# Patient Record
Sex: MALE | Race: ASIAN | NOT HISPANIC OR LATINO | Employment: FULL TIME | URBAN - METROPOLITAN AREA
[De-identification: names, ages, dates, MRNs, and addresses within clinical notes are randomized per-mention and may not be internally consistent; named-entity substitution may affect disease eponyms.]

---

## 2021-03-05 ENCOUNTER — TRANSCRIBE ORDERS (OUTPATIENT)
Dept: ADMINISTRATIVE | Facility: HOSPITAL | Age: 31
End: 2021-03-05

## 2021-03-05 DIAGNOSIS — M25.561 RIGHT KNEE PAIN, UNSPECIFIED CHRONICITY: Primary | ICD-10-CM

## 2021-03-20 ENCOUNTER — HOSPITAL ENCOUNTER (OUTPATIENT)
Dept: MRI IMAGING | Facility: HOSPITAL | Age: 31
Discharge: HOME | End: 2021-03-20
Payer: COMMERCIAL

## 2021-03-20 DIAGNOSIS — M25.561 RIGHT KNEE PAIN, UNSPECIFIED CHRONICITY: ICD-10-CM

## 2021-03-20 PROCEDURE — G1004 CDSM NDSC: HCPCS

## 2021-03-20 PROCEDURE — 73723 MRI JOINT LWR EXTR W/O&W/DYE: CPT

## 2021-03-20 PROCEDURE — A9585 GADOBUTROL INJECTION: HCPCS | Performed by: RADIOLOGY

## 2021-03-20 RX ADMIN — GADOBUTROL 8 ML: 604.72 INJECTION INTRAVENOUS at 16:30

## 2021-06-10 ENCOUNTER — OFFICE VISIT (OUTPATIENT)
Dept: OBGYN CLINIC | Facility: CLINIC | Age: 31
End: 2021-06-10
Payer: COMMERCIAL

## 2021-06-10 ENCOUNTER — APPOINTMENT (OUTPATIENT)
Dept: RADIOLOGY | Facility: CLINIC | Age: 31
End: 2021-06-10
Payer: COMMERCIAL

## 2021-06-10 VITALS
HEIGHT: 70 IN | BODY MASS INDEX: 25.91 KG/M2 | SYSTOLIC BLOOD PRESSURE: 116 MMHG | DIASTOLIC BLOOD PRESSURE: 78 MMHG | WEIGHT: 181 LBS

## 2021-06-10 DIAGNOSIS — M76.31 IT BAND SYNDROME, RIGHT: Primary | ICD-10-CM

## 2021-06-10 DIAGNOSIS — M25.561 RIGHT KNEE PAIN, UNSPECIFIED CHRONICITY: ICD-10-CM

## 2021-06-10 DIAGNOSIS — M76.891 TENDINITIS OF RIGHT KNEE: ICD-10-CM

## 2021-06-10 PROCEDURE — 99203 OFFICE O/P NEW LOW 30 MIN: CPT | Performed by: ORTHOPAEDIC SURGERY

## 2021-06-10 PROCEDURE — 73564 X-RAY EXAM KNEE 4 OR MORE: CPT

## 2021-06-10 RX ORDER — MELOXICAM 15 MG/1
15 TABLET ORAL DAILY
COMMUNITY
End: 2021-10-21 | Stop reason: ALTCHOICE

## 2021-06-10 RX ORDER — IBUPROFEN 200 MG
TABLET ORAL EVERY 6 HOURS PRN
COMMUNITY
End: 2021-10-21 | Stop reason: ALTCHOICE

## 2021-06-10 NOTE — PROGRESS NOTES
Assessment:     1  It band syndrome, right    2  Tendinitis of right knee        Plan:     Problem List Items Addressed This Visit        Musculoskeletal and Integument    It band syndrome, right - Primary    Relevant Orders    XR knee 4+ vw right injury    Ambulatory referral to Physical Therapy    Tendinitis of right knee          Findings consistent with distal IT band syndrome, hamstring tendonitis  Imaging and prognosis reviewed with patient  Patient's symptoms most consistent with inflammation of the IT band and lateral hamstring  We will refer to rehab for hamstring, IT band stretching, modalities, HEP  Avoid activities which are aggravating factors  He can use topical creams Voltaren gel,  NSAID as needed for pain  See patient back in 6-8 weeks to re evaluate  All patient's questions were answered to his satisfaction  This note is created using dictation transcription  It may contain typographical errors, grammatical errors, improperly dictated words, background noise and other errors  Subjective:     Patient ID: Mary Haro is a 31 y o  male  Chief Complaint:  31 yr old male in for evaluation of his right knee  For the past month he has been having lateral knee pain w/o injury, trauma, change in activity  Denies instability, swelling, locking  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, progressively worsening as day goes on  He is also a runs usually on treadmill 15 minutes a day but has stopped due to pain  Also pain sitting Togolese style  He can pinpoint localized area lateral aspect of knee  No issues with stairs  He has tried 2 week course of mobic, topical creams which do help but when they wear off symptoms return  He has tried gentle stretching  Information on patient's intake form was reviewed  Allergy:  No Known Allergies  Medications:  all current active meds have been reviewed  Past Medical History:  History reviewed  No pertinent past medical history    Past Surgical History:  History reviewed  No pertinent surgical history  Family History:  Family History   Problem Relation Age of Onset    Diabetes Father     Hypertension Father      Social History:  Social History     Substance and Sexual Activity   Alcohol Use    Binge frequency: Less than monthly     Social History     Substance and Sexual Activity   Drug Use Not on file     Social History     Tobacco Use   Smoking Status Never Smoker   Smokeless Tobacco Never Used     Review of Systems   Constitutional: Negative for chills and fever  HENT: Negative for ear pain and sore throat  Eyes: Negative for pain and visual disturbance  Respiratory: Negative for cough and shortness of breath  Cardiovascular: Negative for chest pain and palpitations  Gastrointestinal: Negative for abdominal pain and vomiting  Genitourinary: Negative for dysuria and hematuria  Musculoskeletal: Positive for arthralgias (Right knee)  Negative for back pain  Skin: Negative for color change and rash  Neurological: Negative for seizures and syncope  Psychiatric/Behavioral: Negative  All other systems reviewed and are negative  Objective:  BP Readings from Last 1 Encounters:   06/10/21 116/78      Wt Readings from Last 1 Encounters:   06/10/21 82 1 kg (181 lb)      BMI:   Estimated body mass index is 25 97 kg/m² as calculated from the following:    Height as of this encounter: 5' 10" (1 778 m)  Weight as of this encounter: 82 1 kg (181 lb)  BSA:   Estimated body surface area is 2 meters squared as calculated from the following:    Height as of this encounter: 5' 10" (1 778 m)  Weight as of this encounter: 82 1 kg (181 lb)  Physical Exam  Vitals signs and nursing note reviewed  Constitutional:       Appearance: Normal appearance  He is well-developed  HENT:      Head: Normocephalic and atraumatic        Right Ear: External ear normal       Left Ear: External ear normal    Eyes:      Extraocular Movements: Extraocular movements intact  Conjunctiva/sclera: Conjunctivae normal    Neck:      Musculoskeletal: Neck supple  Pulmonary:      Effort: Pulmonary effort is normal    Musculoskeletal:      Right knee: He exhibits no effusion  Skin:     General: Skin is warm  Neurological:      Mental Status: He is alert and oriented to person, place, and time  Psychiatric:         Mood and Affect: Mood normal          Behavior: Behavior normal        Right Knee Exam     Muscle Strength   The patient has normal right knee strength  Tenderness   The patient is experiencing no tenderness  Range of Motion   The patient has normal right knee ROM  Tests   Ivy:  Medial - negative Lateral - negative  Varus: negative Valgus: negative  Patellar apprehension: negative    Other   Erythema: absent  Scars: absent  Sensation: normal  Pulse: present  Swelling: none  Effusion: no effusion present    Comments: Well tracking patella             I have personally reviewed pertinent films in PACS and my interpretation is Right knee MRI with contracts show intact meniscus, ACL, PCL, LCL, MCL  No increase bone marrow activities  X-ray of the right knee show good joint alignment  No soft tissue calcification or DJD       Scribe Attestation    I,:  Tracey Talavera am acting as a scribe while in the presence of the attending physician :       I,:  Juan Ledbetter MD personally performed the services described in this documentation    as scribed in my presence :

## 2021-06-18 ENCOUNTER — OFFICE VISIT (OUTPATIENT)
Dept: PHYSICAL THERAPY | Facility: CLINIC | Age: 31
End: 2021-06-18
Payer: COMMERCIAL

## 2021-06-18 DIAGNOSIS — M76.31 IT BAND SYNDROME, RIGHT: Primary | ICD-10-CM

## 2021-06-18 DIAGNOSIS — M76.891 TENDINITIS OF RIGHT KNEE: ICD-10-CM

## 2021-06-18 PROCEDURE — 97161 PT EVAL LOW COMPLEX 20 MIN: CPT | Performed by: PHYSICAL THERAPIST

## 2021-06-18 NOTE — PROGRESS NOTES
PT Evaluation     Today's date: 2021  Patient name: Kasia Chiu  : 1990  MRN: [id number]  Referring provider: Kaleb Krishnan PT  Dx:   Encounter Diagnosis     ICD-10-CM    1  It band syndrome, right  M76 31    2  Tendinitis of right knee  M76 891                   Assessment  Assessment details: Kasia Chiu is a 31 y o  male presenting to physical therapy with right distal biceps femoris and ITB pain, decreased range of motion, decreased strength, gait/balance dysfunction and decreased activity tolerance  Assessment reveals significant distal ITB and biceps femoris tightness with lateral patellar tracking as per testing and functional movements screen  Secondary to these impairments, patient has increased difficulty performing ADL's, household chores and  work related tasks  Phong Roberto would benefit from skilled PT to address these issues and maximize function  Thank you for the referral     Impairments: abnormal gait, abnormal muscle tone, abnormal or restricted ROM, abnormal movement, activity intolerance, impaired balance, impaired physical strength, lacks appropriate home exercise program, pain with function and weight-bearing intolerance  Understanding of Dx/Px/POC: excellent  Goals  STG (4 weeks)  1  Patient will be independent with HEP  2  Decrease pain at worst by 2 points on NPRS  3  Increase lateral step down quality of movement to good for decreased distal ITB strain  4  Patient will demonstrate ability to stand > 2 hrs prior to onset of symptoms  LTG (8 weeks)  1  Decrease pain at worst from 4 points on NPRS  2  Increase lateral step down quality of movement equal to the left for decreased distal ITB strain  3  Increase right glut medius strength by 1 MMT grade  4  Patient will demonstrate ability to stand for prolonged periods of time at work without pain or symptoms  5   Increase FOTO > or equal to expected outcome    Plan  Patient would benefit from: skilled PT  Planned therapy interventions: joint mobilization, manual therapy, neuromuscular re-education, patient education, strengthening, stretching, therapeutic exercise, home exercise program, ADL training and balance  Frequency: 2x week  Duration in weeks: 8  Treatment plan discussed with: patient        Subjective Evaluation    History of Present Illness  Mechanism of injury: Patient reports to outpatient PT secondary to acute onset of right knee (about 6 months)  Patient denies any trauma but notes that his pain is worse with sitting cross legged, weight lifting, and prolonged standing and with running  Patient also notes that his pain is worse as the day progresses  Pain is relieved by rest and sitting  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  Patient does note that he has been resting and avoiding painful activities and mobic with topical creams without significant relief  Patients goals for PT are to decrease the pain and resume normal activities and PLOF  Not a recurrent problem   Quality of life: excellent    Pain  Current pain ratin  At best pain ratin  At worst pain rating: 10  Quality: tight and sharp  Relieving factors: rest and relaxation  Aggravating factors: standing and walking  Progression: worsening    Social Support  Steps to enter house: yes  Stairs in house: yes   Lives in: multiple-level home    Employment status: working    Diagnostic Tests  X-ray: normal  MRI studies: normal  Treatments  Previous treatment: medication  Current treatment: medication  Patient Goals  Patient goals for therapy: increased strength, independence with ADLs/IADLs, return to sport/leisure activities, increased motion and decreased pain          Objective     Palpation     Right   Hypertonic in the distal biceps femoris and vastus lateralis  Tenderness of the distal biceps femoris and vastus lateralis  Tenderness     Right Knee   Tenderness in the ITB   No tenderness in the fibular head, lateral joint line, LCL (distal), LCL (proximal) and pes anserinus  Neurological Testing     Sensation     Knee   Left Knee   Intact: light touch    Right Knee   Intact: light touch     Reflexes   Left   Patellar (L4): normal (2+)  Achilles (S1): brisk (3+)  Babinski sign: negative  Clonus sign: positive    Right   Patellar (L4): normal (2+)  Achilles (S1): brisk (3+)  Babinski sign: negative  Clonus sign: positive    Active Range of Motion     Right Knee   Flexion: WFL  Extension: [facility]    Patellar Static Positioning     Additional Patellar Static Positioning Details  (+) Lateral tracking    Strength/Myotome Testing     Left Knee   Flexion: 4+  Extension: 4+    Right Knee   Flexion: 4+  Extension: 4+    Additional Strength Details  Hip Abduction: 3-/5 B/L    Tests     Right Knee   Positive lateral Ivy and medial Ivy  Negative anterior drawer, anterior Lachman, patellar compression and patella-femoral grind       Additional Tests Details  Lateral step down test:    4/7 on R  2/7 on L    (+) Jorge's on the right             Precautions: None      Manuals 6/18            R ITB stretching             R ITB distal release             R patellar taping - medial glide performed                         Neuro Re-Ed                                                                                                        Ther Ex             Side hip abduction 3x10            Side clamshells GTB  3x10            Distal ITB stretching Supine  4x30"            Standing hip abduction             Monster walks             Pball HS curls             Rec bike warm up                          Ther Activity                                       Gait Training                                       Modalities

## 2021-07-02 ENCOUNTER — OFFICE VISIT (OUTPATIENT)
Dept: PHYSICAL THERAPY | Facility: CLINIC | Age: 31
End: 2021-07-02
Payer: COMMERCIAL

## 2021-07-02 DIAGNOSIS — M76.31 IT BAND SYNDROME, RIGHT: Primary | ICD-10-CM

## 2021-07-02 DIAGNOSIS — M76.891 TENDINITIS OF RIGHT KNEE: ICD-10-CM

## 2021-07-02 PROCEDURE — 97110 THERAPEUTIC EXERCISES: CPT | Performed by: PHYSICAL THERAPIST

## 2021-07-02 NOTE — PROGRESS NOTES
Daily Note     Today's date: 2021  Patient name: Jayne Lama  : 1990  MRN: [id number]  Referring provider: Leah Beckford PT  Dx:   Encounter Diagnosis     ICD-10-CM    1  It band syndrome, right  M76 31    2  Tendinitis of right knee  M76 891                   Subjective: Patient reports improving good days but bad days remain  1 good day for every 2 bad days reported  Patient has maintained HEP compliance  Objective: See treatment diary below      Assessment: Cues required to improve mechanics with patients HEP to maximze gravity and eliminate substitutions  Progressed HEP which was noted below and patient performed well  Educated regarding lekotape/cover roll and potential j brace usage for improved patellar tracking  Plan: Continue per plan of care        Precautions: None      Manuals            R ITB stretching  4x30"           R ITB distal release  5' IASTM           R patellar taping - medial glide performed performed                        Neuro Re-Ed                                                                                                        Ther Ex             Side hip abduction 3x10 3x10           Side clamshells GTB  3x10 BTB  3x10           Distal ITB stretching Supine  4x30" reviewed           Standing hip abduction  BTB  3x10 B/L           Monster walks  BTB  x2 sets to fatigue           Pball HS curls  3x10 w/ glut lift + arm usage           Rec bike warm up                          Ther Activity                                       Gait Training                                       Modalities

## 2021-07-20 ENCOUNTER — OFFICE VISIT (OUTPATIENT)
Dept: PHYSICAL THERAPY | Facility: CLINIC | Age: 31
End: 2021-07-20
Payer: COMMERCIAL

## 2021-07-20 DIAGNOSIS — M76.31 IT BAND SYNDROME, RIGHT: Primary | ICD-10-CM

## 2021-07-20 DIAGNOSIS — M76.891 TENDINITIS OF RIGHT KNEE: ICD-10-CM

## 2021-07-20 PROCEDURE — 97140 MANUAL THERAPY 1/> REGIONS: CPT | Performed by: PHYSICAL THERAPIST

## 2021-07-20 NOTE — PROGRESS NOTES
Daily Note     Today's date: 2021  Patient name: Renata Ceja  : 1990  MRN: [id number]  Referring provider: Parviz Vazquez PT  Dx:   Encounter Diagnosis     ICD-10-CM    1  It band syndrome, right  M76 31    2  Tendinitis of right knee  M76 891                   Subjective: Patient reports continued HEP compliance and states that overall his pain is much less and feels it only intermittently  Objective: See treatment diary below      Assessment: Patient demonstrating mild tightness of the biceps femoris and lateral gastrocnemius  Patient was educated in self release and stretching of his gastroc/soleus to be performed with his HEP  Will f/u as needed in the next 1-2 weeks  Plan: Continue per plan of care  Precautions: None      Manuals           R ITB stretching  4x30" 4x30"          R ITB distal release  5' IASTM 5' IASTM + lateral gastroc release 5'          R patellar taping - medial glide performed performed                        Neuro Re-Ed                                                                                                        Ther Ex             Side hip abduction 3x10 3x10           Side clamshells GTB  3x10 BTB  3x10           Distal ITB stretching Supine  4x30" reviewed           Standing hip abduction  BTB  3x10 B/L           Monster walks  BTB  x2 sets to fatigue           Pball HS curls  3x10 w/ glut lift + arm usage           Rec bike warm up             Lax ball STM   3'          Gastroc/soleus slant stretch   4x30" ea  Ther Activity                                       Gait Training                                       Modalities                                             Renata Ceja attended physical therapy from 21 until 21 for 3 treatment sessions regarding right ITB pain    Patient made improvement throughout treatment but am unable to achieve discharge information secondary to patient not returning for follow up appointments  Patient will be discharged from PT at this time  Thank you

## 2021-10-21 ENCOUNTER — OFFICE VISIT (OUTPATIENT)
Dept: FAMILY MEDICINE CLINIC | Facility: CLINIC | Age: 31
End: 2021-10-21
Payer: COMMERCIAL

## 2021-10-21 VITALS
OXYGEN SATURATION: 99 % | SYSTOLIC BLOOD PRESSURE: 110 MMHG | HEART RATE: 76 BPM | HEIGHT: 70 IN | DIASTOLIC BLOOD PRESSURE: 70 MMHG | TEMPERATURE: 96.9 F | WEIGHT: 180 LBS | BODY MASS INDEX: 25.77 KG/M2

## 2021-10-21 DIAGNOSIS — M76.31 IT BAND SYNDROME, RIGHT: ICD-10-CM

## 2021-10-21 DIAGNOSIS — Z13.6 ENCOUNTER FOR LIPID SCREENING FOR CARDIOVASCULAR DISEASE: ICD-10-CM

## 2021-10-21 DIAGNOSIS — Z13.1 SCREENING FOR DIABETES MELLITUS: ICD-10-CM

## 2021-10-21 DIAGNOSIS — Z13.220 ENCOUNTER FOR LIPID SCREENING FOR CARDIOVASCULAR DISEASE: ICD-10-CM

## 2021-10-21 DIAGNOSIS — Z00.00 ANNUAL PHYSICAL EXAM: Primary | ICD-10-CM

## 2021-10-21 DIAGNOSIS — Z11.4 ENCOUNTER FOR SCREENING FOR HUMAN IMMUNODEFICIENCY VIRUS (HIV): ICD-10-CM

## 2021-10-21 DIAGNOSIS — Z11.59 ENCOUNTER FOR HEPATITIS C SCREENING TEST FOR LOW RISK PATIENT: ICD-10-CM

## 2021-10-21 PROCEDURE — 99385 PREV VISIT NEW AGE 18-39: CPT | Performed by: FAMILY MEDICINE

## 2021-10-21 PROCEDURE — 3725F SCREEN DEPRESSION PERFORMED: CPT | Performed by: FAMILY MEDICINE

## 2021-10-21 PROCEDURE — 3008F BODY MASS INDEX DOCD: CPT | Performed by: FAMILY MEDICINE

## 2021-10-21 PROCEDURE — 1036F TOBACCO NON-USER: CPT | Performed by: FAMILY MEDICINE

## 2023-07-11 ENCOUNTER — EVALUATION (OUTPATIENT)
Dept: PHYSICAL THERAPY | Facility: CLINIC | Age: 33
End: 2023-07-11

## 2023-07-11 DIAGNOSIS — M25.561 CHRONIC PAIN OF RIGHT KNEE: Primary | ICD-10-CM

## 2023-07-11 DIAGNOSIS — M76.31 ILIOTIBIAL BAND SYNDROME OF RIGHT SIDE: ICD-10-CM

## 2023-07-11 DIAGNOSIS — G89.29 CHRONIC PAIN OF RIGHT KNEE: Primary | ICD-10-CM

## 2023-07-11 PROCEDURE — 97110 THERAPEUTIC EXERCISES: CPT | Performed by: PHYSICAL THERAPIST

## 2023-07-11 PROCEDURE — 97161 PT EVAL LOW COMPLEX 20 MIN: CPT | Performed by: PHYSICAL THERAPIST

## 2023-07-11 NOTE — PROGRESS NOTES
PT Evaluation     Today's date: 2023  Patient name: Pennie Vasquez  : 1990  MRN: [id number]  Referring provider: Ericka Messer, *  Dx:   Encounter Diagnosis     ICD-10-CM    1. Chronic pain of right knee  M25.561     G89.29       2. Iliotibial band syndrome of right side  M76.31                      Assessment/Plan  Mr. Tre Crystal is a 34 y.o. male presenting to outpatient physical therapy with R knee pain which restricts their ability to participate in ADLs, work, and recreational activities without pain. Functional limitations are result of the following impairments: decreased proximal tib/fib mobility, decreased knee/hip strength, pain with function, and TTP fib head. Symptoms are consistent with pathoanatomical diagnosis is IT band vs tib/fib dysfunction. PMH is unremarkable. No further referral appears necessary at this time based upon examination results    Patient was given the opportunity to ask questions, and all questions were answered to the patient's satisfaction. The patients's greatest concerns are the pain not getting better, fear of not being able to stay active, getting back to recreational activity or sport and fear of pain getting worse. Patient appears motivated, agrees with the POC, and is a good candidate for skilled physical therapy at this time. Patient was provided with handout of HEP consisting of knee and hip strengthening exercises    Symptom irritability: Low   Understanding of Dx/Px/POC: good  Prognosis: good  Negative prognostic indicators include: n/a    Goals  STG (3 weeks)  Pain: Patient will subjectively report maximum pain decreased by 2/10  Strength: Patient's will demonstrate knee extension strength improved by 1/2 grade  Function: Patient increase score on FOTO by 10 points    LTG (6 weeks)  Pain: Patient will subjectively report less than 2/10 pain with functional activities.   Strength: Patient's will demonstrate knee extension and glute strength improved to WNL  Function: Patient will increase score on FOTO to predicted value or better  Patient will be ind with HEP by [place] details: Prognosis above is given PT services 1-2x/week over the next 4 weeks and home program adherence. Patient would benefit from: skilled physical therapy, home exercise program  Referral necessary: no  Planned modality interventions: Hydrocollator packs, Cryotherapy, TENS and Low level laser  Planned therapy interventions: joint mobilization, manual therapy, massage, neuromuscular re-education, patient education, stretching, strengthening, therapeutic activities, therapeutic exercise, home exercise program, functional ROM exercises, gait training, flexibility, balance, abdominal trunk stabilization, motor coordination training, coordination and behavior modification  Frequency: 2x/week for 4 weeks  Duration in visits: 8  Plan of Care beginning date: 7/11/2023   Plan of Care expiration date: 8/08/2023  Treatment plan discussed with: patient    Subjective  IE (7/11/2023): Patient is a 34 y.o. male who presents for an initial outpatient physical therapy consultation regarding their R knee pain. Patient reports that his pain began about 7-8 years ago. Intermittent in nature. About 3 years ago, he was weight lifting, and it felt like it was getting worse. Consulted ortho, had an x-ray, MRI, and EMG, which were all negative. Previous PT for IT band got 90% better. Stopped doing his HEP because it was feeling better, but the pain came back. 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 Aggravating factors: Standing, driving, sitting kisha cross, weight lifting. Alleviating factors: ibuprofen, rest, foam roller, heat, ice  Functional limitations: ADLs, work  Consultations: Patient reports that they consulted orthopedics, and xrays were     Pain  Best: 5/10  Worst: 10/10  Irritability: minutes to hours.   Location: lateral knee  Quality: burning Progression: not hcanging. Social Support  Employment status: pharmacist  Hobbies/Recreational Activities: weight lifting, running,     Diagnostic Tests  Per patient: had xray, MRI, and EMG which were all negative. Patient Goals for Therapy  "decreased pain"    Objective     Tenderness     Right Knee   Tenderness in the fibular head. No tenderness in the ITB, lateral joint line, lateral patella, lateral retinaculum, LCL (distal) and LCL (proximal). Neurological Testing     Sensation     Knee   Left Knee   Intact: light touch    Right Knee   Intact: light touch     Active Range of Motion   Left Knee   Normal active range of motion    Right Knee   Normal active range of motion    Mobility     Additional Mobility Details  Fibular head mobility: decreased    Strength/Myotome Testing     Left Hip   Planes of Motion   Flexion: 5  Abduction: 4+    Right Hip   Planes of Motion   Flexion: 4  Abduction: 4-    Left Knee   Flexion: 5  Extension: 5    Right Knee   Flexion: 4+  Extension: 4+    Tests     Right Knee   Negative lateral Ivy, medial Ivy, valgus stress test at 30 degrees and varus stress test at 30 degrees.               Precautions: n/a      Manuals 7/11            Fib head mob JF            Knee distraction moib                                       Neuro Re-Ed             clamshells HEP            Hip 3 ways HEP            Monster walk                                                                 Ther Ex             Bike             S/L hip ABD             S/L march             Leg press             Step ups             Lateral step ups                                       Ther Activity                                       Gait Training                                       Modalities             LASER Sprain/strain    4 min  15w            IE/HEP JF

## 2023-07-11 NOTE — LETTER
2023    South Carter DO  [address] [place] [id number]    Patient: Ken Carlson   YOB: 1990   Date of Visit: 2023     Encounter Diagnosis     ICD-10-CM    1. Chronic pain of right knee  M25.561     G89.29       2. Iliotibial band syndrome of right side  M76.31           Dear Dr. Luis Miguel Calvillo: Thank you for your recent referral of Ken Carlson. Please review the attached evaluation summary from Neha's recent visit. Please verify that you agree with the plan of care by signing the attached order. If you have any questions or concerns, please do not hesitate to call. I sincerely appreciate the opportunity to share in the care of one of your patients and hope to have another opportunity to work with you in the near future. Sincerely,    Joan High, PT      Referring Provider:      I certify that I have read the below Plan of Care and certify the need for these services furnished under this plan of treatment while under my care. South Carter   [address] [facility] [id number]  Via Fax: [phone]          PT Evaluation     Today's date: 2023  Patient name: Ken Carlson  : 1990  MRN: [id number]  Referring provider: South Carter, *  Dx:   Encounter Diagnosis     ICD-10-CM    1. Chronic pain of right knee  M25.561     G89.29       2. Iliotibial band syndrome of right side  M76.31                      Assessment/Plan  Mr. Marialuisa Salass is a 34 y.o. male presenting to outpatient physical therapy with R knee pain which restricts their ability to participate in ADLs, work, and recreational activities without pain. Functional limitations are result of the following impairments: decreased proximal tib/fib mobility, decreased knee/hip strength, pain with function, and TTP fib head. Symptoms are consistent with pathoanatomical diagnosis is IT band vs tib/fib dysfunction. PMH is unremarkable. No further referral appears necessary at this time based upon examination results    Patient was given the opportunity to ask questions, and all questions were answered to the patient's satisfaction. The patients's greatest concerns are the pain not getting better, fear of not being able to stay active, getting back to recreational activity or sport and fear of pain getting worse. Patient appears motivated, agrees with the POC, and is a good candidate for skilled physical therapy at this time. Patient was provided with handout of HEP consisting of knee and hip strengthening exercises    Symptom irritability: Low   Understanding of Dx/Px/POC: good  Prognosis: good  Negative prognostic indicators include: n/a    Goals  STG (3 weeks)  Pain: Patient will subjectively report maximum pain decreased by 2/10  Strength: Patient's will demonstrate knee extension strength improved by 1/2 grade  Function: Patient increase score on FOTO by 10 points    LTG (6 weeks)  Pain: Patient will subjectively report less than 2/10 pain with functional activities. Strength: Patient's will demonstrate knee extension and glute strength improved to WNL  Function: Patient will increase score on FOTO to predicted value or better  Patient will be ind with HEP by [place] details: Prognosis above is given PT services 1-2x/week over the next 4 weeks and home program adherence.    Patient would benefit from: skilled physical therapy, home exercise program  Referral necessary: no  Planned modality interventions: Hydrocollator packs, Cryotherapy, TENS and Low level laser  Planned therapy interventions: joint mobilization, manual therapy, massage, neuromuscular re-education, patient education, stretching, strengthening, therapeutic activities, therapeutic exercise, home exercise program, functional ROM exercises, gait training, flexibility, balance, abdominal trunk stabilization, motor coordination training, coordination and behavior modification  Frequency: 2x/week for 4 weeks  Duration in visits: 8  Plan of Care beginning date: 7/11/2023   Plan of Care expiration date: 8/08/2023  Treatment plan discussed with: patient    Subjective  IE (7/11/2023): Patient is a 34 y.o. male who presents for an initial outpatient physical therapy consultation regarding their R knee pain. Patient reports that his pain began about 7-8 years ago. Intermittent in nature. About 3 years ago, he was weight lifting, and it felt like it was getting worse. Consulted ortho, had an x-ray, MRI, and EMG, which were all negative. Previous PT for IT band got 90% better. Stopped doing his HEP because it was feeling better, but the pain came back. 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 Aggravating factors: Standing, driving, sitting kisha cross, weight lifting. Alleviating factors: ibuprofen, rest, foam roller, heat, ice  Functional limitations: ADLs, work  Consultations: Patient reports that they consulted orthopedics, and xrays were     Pain  Best: 5/10  Worst: 10/10  Irritability: minutes to hours. Location: lateral knee  Quality: burning   Progression: not hcanging. Social Support  Employment status: pharmacist  Hobbies/Recreational Activities: weight lifting, running,     Diagnostic Tests  Per patient: had xray, MRI, and EMG which were all negative. Patient Goals for Therapy  "decreased pain"    Objective     Tenderness     Right Knee   Tenderness in the fibular head. No tenderness in the ITB, lateral joint line, lateral patella, lateral retinaculum, LCL (distal) and LCL (proximal).      Neurological Testing     Sensation     Knee   Left Knee   Intact: light touch    Right Knee   Intact: light touch     Active Range of Motion   Left Knee   Normal active range of motion    Right Knee   Normal active range of motion    Mobility     Additional Mobility Details  Fibular head mobility: decreased    Strength/Myotome Testing Left Hip   Planes of Motion   Flexion: 5  Abduction: 4+    Right Hip   Planes of Motion   Flexion: 4  Abduction: 4-    Left Knee   Flexion: 5  Extension: 5    Right Knee   Flexion: 4+  Extension: 4+    Tests     Right Knee   Negative lateral Ivy, medial Ivy, valgus stress test at 30 degrees and varus stress test at 30 degrees.              Precautions: n/a      Manuals 7/11            Fib head mob JF            Knee distraction moib                                       Neuro Re-Ed             clamshells HEP            Hip 3 ways HEP            Monster walk                                                                 Ther Ex             Bike             S/L hip ABD             S/L march             Leg press             Step ups             Lateral step ups                                       Ther Activity                                       Gait Training                                       Modalities             LASER Sprain/strain    4 min  15w            IE/HEP JF

## 2023-07-13 ENCOUNTER — OFFICE VISIT (OUTPATIENT)
Dept: PHYSICAL THERAPY | Facility: CLINIC | Age: 33
End: 2023-07-13

## 2023-07-13 DIAGNOSIS — M76.31 ILIOTIBIAL BAND SYNDROME OF RIGHT SIDE: ICD-10-CM

## 2023-07-13 DIAGNOSIS — G89.29 CHRONIC PAIN OF RIGHT KNEE: Primary | ICD-10-CM

## 2023-07-13 DIAGNOSIS — M25.561 CHRONIC PAIN OF RIGHT KNEE: Primary | ICD-10-CM

## 2023-07-13 PROCEDURE — 97110 THERAPEUTIC EXERCISES: CPT | Performed by: PHYSICAL THERAPIST

## 2023-07-13 PROCEDURE — 97112 NEUROMUSCULAR REEDUCATION: CPT | Performed by: PHYSICAL THERAPIST

## 2023-07-13 NOTE — PROGRESS NOTES
Daily Note     Today's date: 2023  Patient name: Kiya Latif  : 1990  MRN: [id number]  Referring provider: Crista Osler, *  Dx:   Encounter Diagnosis     ICD-10-CM    1. Chronic pain of right knee  M25.561     G89.29       2. Iliotibial band syndrome of right side  M76.31                      Subjective: Mild soreness after the eval, but overall he feels a little better. Objective: See treatment diary below. Treatment as indicated below. Assessment: Reviewed HEP to ensure proper form and to answer any questions regarding prescribed exercises. Patient demonstrates good recall and form with minimal cuing. Patient was fatigued post-session. Continue to progress as tolerated. Patient will continue to benefit from skilled PT in order to address impairments and improve function. Plan: Continue per plan of care. Progress treatment as tolerated.        Precautions: n/a      Manuals            Fib head mob JF JF           Knee distraction moib                                       Neuro Re-Ed             clamshells HEP BTB  2x10           Hip 3 ways HEP            Monster walk                                                                 Ther Ex             Bike  5 min           S/L hip ABD             Side lying   GTB  2x10           Leg press  3x10  75#           Step ups  8"  2x10           Lateral step ups             LAQ  5#  3x10                        Ther Activity                                       Gait Training                                       Modalities             LASER Sprain/strain    4 min  15w Sprain/strain    4 min  15w           IE/HEP JF

## 2023-07-18 ENCOUNTER — OFFICE VISIT (OUTPATIENT)
Dept: PHYSICAL THERAPY | Facility: CLINIC | Age: 33
End: 2023-07-18

## 2023-07-18 DIAGNOSIS — G89.29 CHRONIC PAIN OF RIGHT KNEE: Primary | ICD-10-CM

## 2023-07-18 DIAGNOSIS — M76.31 ILIOTIBIAL BAND SYNDROME OF RIGHT SIDE: ICD-10-CM

## 2023-07-18 DIAGNOSIS — M25.561 CHRONIC PAIN OF RIGHT KNEE: Primary | ICD-10-CM

## 2023-07-18 PROCEDURE — 97140 MANUAL THERAPY 1/> REGIONS: CPT | Performed by: PHYSICAL THERAPIST

## 2023-07-18 PROCEDURE — 97110 THERAPEUTIC EXERCISES: CPT | Performed by: PHYSICAL THERAPIST

## 2023-07-18 NOTE — PROGRESS NOTES
Daily Note     Today's date: 2023  Patient name: Christine Clemente  : 1990  MRN: [id number]  Referring provider: Meghna Diaz, *  Dx:   Encounter Diagnosis     ICD-10-CM    1. Chronic pain of right knee  M25.561     G89.29       2. Iliotibial band syndrome of right side  M76.31                      Subjective: Knee feels about the same. A few hours into work he starts having the knee pain. Objective: See treatment diary below. Treatment as indicated below. Assessment: Evident fatigue of the glute with progressions this session. Patient continues to be challenged appropriately with ther-ex. Continue to progress as tolerated. Patient will continue to benefit from skilled PT in order to address impairments and improve function. Plan: Continue per plan of care. Progress treatment as tolerated.        Precautions: n/a      Manuals           Fib head mob JF JF JF          Knee distraction moib                                       Neuro Re-Ed             clamshells HEP BTB  2x10 BTB  2x10          Hip 3 ways HEP  [place]  2x10          Monster walk                                                                 Ther Ex             Bike  5 min 5 min          S/L hip ABD             Side lying   GTB  2x10 GTB  2x10          Leg press  3x10  75# 3x10  75#          Step ups  8"  2x10 8"  2x10          Lateral step ups             LAQ  5#  3x10 5#  3x10          Lunges             STS   BMB  2x10          Ther Activity                                       Gait Training                                       Modalities             LASER Sprain/strain    4 min  15w Sprain/strain    4 min  15w Sprain/strain    4 min  15w          IE/HEP JF

## 2023-07-20 ENCOUNTER — OFFICE VISIT (OUTPATIENT)
Dept: PHYSICAL THERAPY | Facility: CLINIC | Age: 33
End: 2023-07-20

## 2023-07-20 DIAGNOSIS — M76.31 ILIOTIBIAL BAND SYNDROME OF RIGHT SIDE: ICD-10-CM

## 2023-07-20 DIAGNOSIS — M25.561 CHRONIC PAIN OF RIGHT KNEE: Primary | ICD-10-CM

## 2023-07-20 DIAGNOSIS — G89.29 CHRONIC PAIN OF RIGHT KNEE: Primary | ICD-10-CM

## 2023-07-20 PROCEDURE — 97110 THERAPEUTIC EXERCISES: CPT | Performed by: PHYSICAL THERAPIST

## 2023-07-20 PROCEDURE — 97112 NEUROMUSCULAR REEDUCATION: CPT | Performed by: PHYSICAL THERAPIST

## 2023-07-20 NOTE — PROGRESS NOTES
Daily Note     Today's date: 2023  Patient name: Sophia Marcus  : 1990  MRN: [id number]  Referring provider: Harris Dickerson, *  Dx:   Encounter Diagnosis     ICD-10-CM    1. Chronic pain of right knee  M25.561     G89.29       2. Iliotibial band syndrome of right side  M76.31                      Subjective: Patient reports being sore following last visit. Notes that his knee is starting to feel a little better. Objective: See treatment diary below. Treatment as indicated below. Assessment: Exercise tolerance improving. Evident fatigue of the glute with progressions this session. Patient continues to be challenged appropriately with ther-ex. Continue to progress as tolerated. Patient will continue to benefit from skilled PT in order to address impairments and improve function. Plan: Continue per plan of care. Progress treatment as tolerated.        Precautions: n/a      Manuals          Fib head mob JF JF JF          Knee distraction moib                                       Neuro Re-Ed             clamshells HEP BTB  2x10 BTB  2x10 BTB  2x10         Hip 3 ways HEP  [place]  2x10 Pink  2x10         Monster walk                                                                 Ther Ex             Bike  5 min 5 min 5 min         Side lying rev march  GTB  2x10 GTB  2x10 GTB  2x10         Leg press  3x10  75# 3x10  75# 3x10  75#         Step ups  8"  2x10 8"  2x10 8"  2x10         Lateral step ups             LAQ  5#  3x10 5#  3x10 7.5#  3x10         Lunges             STS   BMB  2x10 BMB  2x10  staggered stance         Ther Activity                                       Gait Training                                       Modalities             LASER Sprain/strain    4 min  15w Sprain/strain    4 min  15w Sprain/strain    4 min  15w Sprain/ strain    4 min  15w         IE/HEP JF

## 2023-07-25 ENCOUNTER — OFFICE VISIT (OUTPATIENT)
Dept: PHYSICAL THERAPY | Facility: CLINIC | Age: 33
End: 2023-07-25

## 2023-07-25 DIAGNOSIS — M25.561 CHRONIC PAIN OF RIGHT KNEE: Primary | ICD-10-CM

## 2023-07-25 DIAGNOSIS — G89.29 CHRONIC PAIN OF RIGHT KNEE: Primary | ICD-10-CM

## 2023-07-25 DIAGNOSIS — M76.31 ILIOTIBIAL BAND SYNDROME OF RIGHT SIDE: ICD-10-CM

## 2023-07-25 PROCEDURE — 97112 NEUROMUSCULAR REEDUCATION: CPT | Performed by: PHYSICAL THERAPIST

## 2023-07-25 PROCEDURE — 97110 THERAPEUTIC EXERCISES: CPT | Performed by: PHYSICAL THERAPIST

## 2023-07-25 NOTE — PROGRESS NOTES
Daily Note     Today's date: 2023  Patient name: Rupa Maradiaga  : 1990  MRN: [id number]  Referring provider: Dulce Maria Trevino, *  Dx:   Encounter Diagnosis     ICD-10-CM    1. Chronic pain of right knee  M25.561     G89.29       2. Iliotibial band syndrome of right side  M76.31                      Subjective: Patient reports that he feel the lateral knee is a little better. Feeling it more up the ITB      Objective: See treatment diary below. Treatment as indicated below. Assessment: Exercise tolerance improving. Evident fatigue of the glute with progressions this session, though it is improving. Patient continues to be challenged appropriately with ther-ex. Added an ITB stretch this session. Continue to progress as tolerated. Patient will continue to benefit from skilled PT in order to address impairments and improve function. Plan: Continue per plan of care. Progress treatment as tolerated.        Precautions: n/a      Manuals         Fib head mob JF JF JF          Knee distraction moib                                       Neuro Re-Ed             clamshells HEP BTB  2x10 BTB  2x10 BTB  2x10 BTB  2x10        Hip 3 ways HEP  [place]  2x10 Pink  2x10 Pink  2x10        Monster walk                                                                 Ther Ex             Bike  5 min 5 min 5 min 5 min        Side lying rev march  GTB  2x10 GTB  2x10 GTB  2x10 GTB  2x10        Leg press  3x10  75# 3x10  75# 3x10  75# 3x10 75#        Step ups  8"  2x10 8"  2x10 8"  2x10 8"  2x10        Supine ITB strap stretch     15"x5        LAQ  5#  3x10 5#  3x10 7.5#  3x10 7.5#  3x10        Lunges             STS   BMB  2x10 BMB  2x10  staggered stance BMB  2x10  staggered stance        Ther Activity                                       Gait Training                                       Modalities             LASER Sprain/strain    4 min  15w Sprain/strain    4 min  15w Sprain/strain    4 min  15w Sprain/ strain    4 min  15w Sprain/ strain    4 min  15w        IE/HEP JF

## 2023-07-27 ENCOUNTER — OFFICE VISIT (OUTPATIENT)
Dept: PHYSICAL THERAPY | Facility: CLINIC | Age: 33
End: 2023-07-27

## 2023-07-27 DIAGNOSIS — M25.561 CHRONIC PAIN OF RIGHT KNEE: Primary | ICD-10-CM

## 2023-07-27 DIAGNOSIS — G89.29 CHRONIC PAIN OF RIGHT KNEE: Primary | ICD-10-CM

## 2023-07-27 DIAGNOSIS — M76.31 ILIOTIBIAL BAND SYNDROME OF RIGHT SIDE: ICD-10-CM

## 2023-07-27 PROCEDURE — 97112 NEUROMUSCULAR REEDUCATION: CPT | Performed by: PHYSICAL THERAPIST

## 2023-07-27 PROCEDURE — 97110 THERAPEUTIC EXERCISES: CPT | Performed by: PHYSICAL THERAPIST

## 2023-07-27 NOTE — PROGRESS NOTES
Daily Note     Today's date: 2023  Patient name: Christine Clemente  : 1990  MRN: [id number]  Referring provider: Meghna Diaz, *  Dx:   Encounter Diagnosis     ICD-10-CM    1. Chronic pain of right knee  M25.561     G89.29       2. Iliotibial band syndrome of right side  M76.31                      Subjective: Patient reports that he is having more discomfort in the glute and ITB area. Thinks the ITB stretch isn't helping. Objective: See treatment diary below. Treatment as indicated below. Assessment: Discussed DOMS. Less fatigue with exercise this session. Patient continues to be challenged appropriately with ther-ex. Added a standing ITB stretch this session. Continue to progress as tolerated. Patient will continue to benefit from skilled PT in order to address impairments and improve function. Plan: Continue per plan of care. Progress treatment as tolerated.        Precautions: n/a      Manuals        Fib head mob JF JF JF          Knee distraction moib                                       Neuro Re-Ed             clamshells HEP BTB  2x10 BTB  2x10 BTB  2x10 BTB  2x10 BTB  2x10       Hip 3 ways HEP  [place]  2x10 Pink  2x10 Pink  2x10 Pink  2x10       Monster walk                                                                 Ther Ex             Bike  5 min 5 min 5 min 5 min 5 min       Side lying rev march  GTB  2x10 GTB  2x10 GTB  2x10 GTB  2x10 GTB  2x10       Leg press  3x10  75# 3x10  75# 3x10  75# 3x10 75# 3x10  75#       Step ups  8"  2x10 8"  2x10 8"  2x10 8"  2x10 8"  2x10       Supine ITB strap stretch     15"x5        LAQ  5#  3x10 5#  3x10 7.5#  3x10 7.5#  3x10 7.5#  3x10       Lunges             STS   BMB  2x10 BMB  2x10  staggered stance BMB  2x10  staggered stance BMB  2x10  staggered stance       Wall ITB stretch      5x15"       Ther Activity                                       Gait Training Modalities             LASER Sprain/strain    4 min  15w Sprain/strain    4 min  15w Sprain/strain    4 min  15w Sprain/ strain    4 min  15w Sprain/ strain    4 min  15w Sprain/ strain    4 min  15w       IE/HEP JF

## 2023-08-01 ENCOUNTER — APPOINTMENT (OUTPATIENT)
Dept: PHYSICAL THERAPY | Facility: CLINIC | Age: 33
End: 2023-08-01

## 2023-08-03 ENCOUNTER — OFFICE VISIT (OUTPATIENT)
Dept: PHYSICAL THERAPY | Facility: CLINIC | Age: 33
End: 2023-08-03

## 2023-08-03 DIAGNOSIS — M25.561 CHRONIC PAIN OF RIGHT KNEE: Primary | ICD-10-CM

## 2023-08-03 DIAGNOSIS — G89.29 CHRONIC PAIN OF RIGHT KNEE: Primary | ICD-10-CM

## 2023-08-03 DIAGNOSIS — M76.31 ILIOTIBIAL BAND SYNDROME OF RIGHT SIDE: ICD-10-CM

## 2023-08-03 PROCEDURE — 97110 THERAPEUTIC EXERCISES: CPT | Performed by: PHYSICAL THERAPIST

## 2023-08-03 PROCEDURE — 97112 NEUROMUSCULAR REEDUCATION: CPT | Performed by: PHYSICAL THERAPIST

## 2023-08-03 NOTE — PROGRESS NOTES
Daily Note     Today's date: 2023  Patient name: Iván Cortes  : 1990  MRN: [id number]  Referring provider: Andre Maldonado, *  Dx:   Encounter Diagnosis     ICD-10-CM    1. Chronic pain of right knee  M25.561     G89.29       2. Iliotibial band syndrome of right side  M76.31                      Subjective: Patient reports that he is having more discomfort in the glute and ITB area. Hard to sleep at night this week. Does note a more active weekend. Objective: See treatment diary below. Treatment as indicated below. Reassessed nerve tension and lumbar mobility  Peripheralization of symptoms to lateral thigh and knee with lumbar flexion  Centralization of symptoms to low back with lumbar extension (standing and prone)      Assessment: Patient appears to have a component of his symptoms coming from his spine. Noted mild nerve tension and a directional preference for extension. Added a standing/prone extension and sciatic nerve glides to patient's HEP, and instructed him to hold all other exercises for the time being. Also educated patient to remain active as much as possible. Continue to progress as tolerated. Patient will continue to benefit from skilled PT in order to address impairments and improve function. Plan: Continue per plan of care. Progress treatment as tolerated.        Precautions: n/a      Manuals /3      Fib head mob JF JF JF          Knee distraction moib             Lumbar PAs       nv- pending response to HEP                   Neuro Re-Ed             clamshells HEP BTB  2x10 BTB  2x10 BTB  2x10 BTB  2x10 BTB  2x10 BTB  2x10      Hip 3 ways HEP  [place]  2x10 Pink  2x10 Pink  2x10 Pink  2x10 held      Monster walk                                                                 Ther Ex             Bike  5 min 5 min 5 min 5 min 5 min 5 min      Side lying rev march  GTB  2x10 GTB  2x10 GTB  2x10 GTB  2x10 GTB  2x10 GTB  2x10      Leg press 3x10  75# 3x10  75# 3x10  75# 3x10 75# 3x10  75# held      Step ups  8"  2x10 8"  2x10 8"  2x10 8"  2x10 8"  2x10 8"  2x10      Supine ITB strap stretch     15"x5        LAQ  5#  3x10 5#  3x10 7.5#  3x10 7.5#  3x10 7.5#  3x10 held      Lunges             STS   BMB  2x10 BMB  2x10  staggered stance BMB  2x10  staggered stance BMB  2x10  staggered stance BMB  2x10  staggered stance      Wall ITB stretch      5x15" held      Ther Activity                                       Gait Training                                       Modalities             LASER Sprain/strain    4 min  15w Sprain/strain    4 min  15w Sprain/strain    4 min  15w Sprain/ strain    4 min  15w Sprain/ strain    4 min  15w Sprain/ strain    4 min  15w held      IE/HEP JF

## 2023-08-08 ENCOUNTER — OFFICE VISIT (OUTPATIENT)
Dept: PHYSICAL THERAPY | Facility: CLINIC | Age: 33
End: 2023-08-08

## 2023-08-08 DIAGNOSIS — M25.561 CHRONIC PAIN OF RIGHT KNEE: Primary | ICD-10-CM

## 2023-08-08 DIAGNOSIS — M76.31 ILIOTIBIAL BAND SYNDROME OF RIGHT SIDE: ICD-10-CM

## 2023-08-08 DIAGNOSIS — G89.29 CHRONIC PAIN OF RIGHT KNEE: Primary | ICD-10-CM

## 2023-08-08 PROCEDURE — 97112 NEUROMUSCULAR REEDUCATION: CPT | Performed by: PHYSICAL THERAPIST

## 2023-08-08 PROCEDURE — 97110 THERAPEUTIC EXERCISES: CPT | Performed by: PHYSICAL THERAPIST

## 2023-08-08 NOTE — PROGRESS NOTES
Daily Note     Today's date: 2023  Patient name: Kiya Latif  : 1990  MRN: [id number]  Referring provider: Crista Osler, *  Dx:   Encounter Diagnosis     ICD-10-CM    1. Chronic pain of right knee  M25.561     G89.29       2. Iliotibial band syndrome of right side  M76.31                      Subjective: Patient reports that he is having more discomfort in the glute and ITB area. Hard to sleep at night this week. Does note a more active weekend. Objective: See treatment diary below. Treatment as indicated below. Reassessed nerve tension and lumbar mobility  Peripheralization of symptoms to lateral thigh and knee with lumbar flexion  Centralization of symptoms to low back with lumbar extension (standing and prone)      Assessment: Patient appears to have a component of his symptoms coming from his spine. Noted mild nerve tension and a directional preference for extension. Added a standing/prone extension and sciatic nerve glides to patient's HEP, and instructed him to hold all other exercises for the time being. Also educated patient to remain active as much as possible. Continue to progress as tolerated. Patient will continue to benefit from skilled PT in order to address impairments and improve function. Plan: Continue per plan of care. Progress treatment as tolerated.        Precautions: n/a      Manuals /3      Fib head mob JF JF JF          Knee distraction moib             Lumbar PAs       nv- pending response to HEP                   Neuro Re-Ed             clamshells HEP BTB  2x10 BTB  2x10 BTB  2x10 BTB  2x10 BTB  2x10 BTB  2x10      Hip 3 ways HEP  [place]  2x10 Pink  2x10 Pink  2x10 Pink  2x10 held      Monster walk                                                                 Ther Ex             Bike  5 min 5 min 5 min 5 min 5 min 5 min      Side lying rev march  GTB  2x10 GTB  2x10 GTB  2x10 GTB  2x10 GTB  2x10 GTB  2x10      Leg press 3x10  75# 3x10  75# 3x10  75# 3x10 75# 3x10  75# held      Step ups  8"  2x10 8"  2x10 8"  2x10 8"  2x10 8"  2x10 8"  2x10      Supine ITB strap stretch     15"x5        LAQ  5#  3x10 5#  3x10 7.5#  3x10 7.5#  3x10 7.5#  3x10 held      Lunges             STS   BMB  2x10 BMB  2x10  staggered stance BMB  2x10  staggered stance BMB  2x10  staggered stance BMB  2x10  staggered stance      Wall ITB stretch      5x15" held      Ther Activity                                       Gait Training                                       Modalities             LASER Sprain/strain    4 min  15w Sprain/strain    4 min  15w Sprain/strain    4 min  15w Sprain/ strain    4 min  15w Sprain/ strain    4 min  15w Sprain/ strain    4 min  15w held      IE/HEP JF               Daily Note     Today's date: 2023  Patient name: Charissa Hung  : 1990  MRN: [id number]  Referring provider: Shun Carter, *  Dx:   Encounter Diagnosis     ICD-10-CM    1. Chronic pain of right knee  M25.561     G89.29       2. Iliotibial band syndrome of right side  M76.31                      Subjective: Patient reports that he is having more discomfort in the glute and ITB area. Hard to sleep at night this week. Does note a more active weekend. Objective: See treatment diary below. Treatment as indicated below. Reassessed nerve tension and lumbar mobility  Peripheralization of symptoms to lateral thigh and knee with lumbar flexion  Centralization of symptoms to low back with lumbar extension (standing and prone)      Assessment: Patient appears to have a component of his symptoms coming from his spine. Noted mild nerve tension and a directional preference for extension. Added a standing/prone extension and sciatic nerve glides to patient's HEP, and instructed him to hold all other exercises for the time being. Also educated patient to remain active as much as possible. Continue to progress as tolerated.  Patient will continue to benefit from skilled PT in order to address impairments and improve function. Plan: Continue per plan of care. Progress treatment as tolerated. Precautions: n/a      Manuals  8/3      Fib head mob JF JF JF          Knee distraction moib             Lumbar PAs       nv- pending response to HEP                   Neuro Re-Ed             clamshells HEP BTB  2x10 BTB  2x10 BTB  2x10 BTB  2x10 BTB  2x10 BTB  2x10      Hip 3 ways HEP  [place]  2x10 Pink  2x10 Pink  2x10 Pink  2x10 held      Monster walk                                                                 Ther Ex             Bike  5 min 5 min 5 min 5 min 5 min 5 min      Side lying rev march  GTB  2x10 GTB  2x10 GTB  2x10 GTB  2x10 GTB  2x10 GTB  2x10      Leg press  3x10  75# 3x10  75# 3x10  75# 3x10 75# 3x10  75# held      Step ups  8"  2x10 8"  2x10 8"  2x10 8"  2x10 8"  2x10 8"  2x10      Supine ITB strap stretch     15"x5        LAQ  5#  3x10 5#  3x10 7.5#  3x10 7.5#  3x10 7.5#  3x10 held      Lunges             STS   BMB  2x10 BMB  2x10  staggered stance BMB  2x10  staggered stance BMB  2x10  staggered stance BMB  2x10  staggered stance      Wall ITB stretch      5x15" held      Ther Activity                                       Gait Training                                       Modalities             LASER Sprain/strain    4 min  15w Sprain/strain    4 min  15w Sprain/strain    4 min  15w Sprain/ strain    4 min  15w Sprain/ strain    4 min  15w Sprain/ strain    4 min  15w held      IE/HEP JF               Daily Note     Today's date: 2023  Patient name: Christine Clemente  : 1990  MRN: [id number]  Referring provider: Meghna Diaz, *  Dx:   Encounter Diagnosis     ICD-10-CM    1. Chronic pain of right knee  M25.561     G89.29       2. Iliotibial band syndrome of right side  M76.31                      Subjective: Patient reports that the burning pain in his lateral pain has improved with the HEP. Noting more lateral knee pain this session. Objective: See treatment diary below. Treatment as indicated below. Assessment: Added ITB STM this session and it improved patient's lateral knee pain with exercises. The combination of glute strengthening and extension based lumbar exercises has patient trending in the right direction. Continue to progress as tolerated. Patient will continue to benefit from skilled PT in order to address impairments and improve function. Plan: Continue per plan of care. Progress treatment as tolerated.        Precautions: n/a      Manuals 7/11 7/13 7/18 7/20 7/25 7/27 8/3 8/8     Fib head mob JF JF JF          ITB STM        JF     Lumbar PAs       nv- pending response to HEP                   Neuro Re-Ed             clamshells HEP BTB  2x10 BTB  2x10 BTB  2x10 BTB  2x10 BTB  2x10 BTB  2x10 Purple  2x10     Hip 3 ways HEP  [place]  2x10 Pink  2x10 Pink  2x10 Pink  2x10 held      Monster walk             Prone press        10x10"     Standing lumbar extension        x10                               Ther Ex             Bike  5 min 5 min 5 min 5 min 5 min 5 min 5 min     Side lying rev march  GTB  2x10 GTB  2x10 GTB  2x10 GTB  2x10 GTB  2x10 GTB  2x10 GTB  2x10     Leg press  3x10  75# 3x10  75# 3x10  75# 3x10 75# 3x10  75# held 75#  3x10     Step ups  8"  2x10 8"  2x10 8"  2x10 8"  2x10 8"  2x10 8"  2x10 8"  2x10     Supine ITB strap stretch     15"x5        LAQ  5#  3x10 5#  3x10 7.5#  3x10 7.5#  3x10 7.5#  3x10 held held     Lunges             STS   BMB  2x10 BMB  2x10  staggered stance BMB  2x10  staggered stance BMB  2x10  staggered stance BMB  2x10  staggered stance BMB  2x10  staggered stance     Wall ITB stretch      5x15" held      Ther Activity                                       Gait Training                                       Modalities             LASER Sprain/strain    4 min  15w Sprain/strain    4 min  15w Sprain/strain    4 min  15w Sprain/ strain    4 min  15w Sprain/ strain    4 min  15w Sprain/ strain    4 min  15w held      IE/HEP JF

## 2023-08-10 ENCOUNTER — OFFICE VISIT (OUTPATIENT)
Dept: PHYSICAL THERAPY | Facility: CLINIC | Age: 33
End: 2023-08-10

## 2023-08-10 DIAGNOSIS — M25.561 CHRONIC PAIN OF RIGHT KNEE: ICD-10-CM

## 2023-08-10 DIAGNOSIS — M76.31 ILIOTIBIAL BAND SYNDROME OF RIGHT SIDE: Primary | ICD-10-CM

## 2023-08-10 DIAGNOSIS — G89.29 CHRONIC PAIN OF RIGHT KNEE: ICD-10-CM

## 2023-08-10 PROCEDURE — 97112 NEUROMUSCULAR REEDUCATION: CPT | Performed by: PHYSICAL THERAPIST

## 2023-08-10 PROCEDURE — 97110 THERAPEUTIC EXERCISES: CPT | Performed by: PHYSICAL THERAPIST

## 2023-08-10 NOTE — PROGRESS NOTES
Daily Note     Today's date: 2023  Patient name: Blanka Perez  : 1990  MRN: [id number]  Referring provider: Josue Chung, *  Dx:   Encounter Diagnosis     ICD-10-CM    1. Chronic pain of right knee  M25.561     G89.29       2. Iliotibial band syndrome of right side  M76.31                      Subjective: Patient reports that he is having more discomfort in the glute and ITB area. Hard to sleep at night this week. Does note a more active weekend. Objective: See treatment diary below. Treatment as indicated below. Reassessed nerve tension and lumbar mobility  Peripheralization of symptoms to lateral thigh and knee with lumbar flexion  Centralization of symptoms to low back with lumbar extension (standing and prone)      Assessment: Patient appears to have a component of his symptoms coming from his spine. Noted mild nerve tension and a directional preference for extension. Added a standing/prone extension and sciatic nerve glides to patient's HEP, and instructed him to hold all other exercises for the time being. Also educated patient to remain active as much as possible. Continue to progress as tolerated. Patient will continue to benefit from skilled PT in order to address impairments and improve function. Plan: Continue per plan of care. Progress treatment as tolerated.        Precautions: n/a      Manuals /3      Fib head mob JF JF JF          Knee distraction moib             Lumbar PAs       nv- pending response to HEP                   Neuro Re-Ed             clamshells HEP BTB  2x10 BTB  2x10 BTB  2x10 BTB  2x10 BTB  2x10 BTB  2x10      Hip 3 ways HEP  [place]  2x10 Pink  2x10 Pink  2x10 Pink  2x10 held      Monster walk                                                                 Ther Ex             Bike  5 min 5 min 5 min 5 min 5 min 5 min      Side lying rev march  GTB  2x10 GTB  2x10 GTB  2x10 GTB  2x10 GTB  2x10 GTB  2x10      Leg press 3x10  75# 3x10  75# 3x10  75# 3x10 75# 3x10  75# held      Step ups  8"  2x10 8"  2x10 8"  2x10 8"  2x10 8"  2x10 8"  2x10      Supine ITB strap stretch     15"x5        LAQ  5#  3x10 5#  3x10 7.5#  3x10 7.5#  3x10 7.5#  3x10 held      Lunges             STS   BMB  2x10 BMB  2x10  staggered stance BMB  2x10  staggered stance BMB  2x10  staggered stance BMB  2x10  staggered stance      Wall ITB stretch      5x15" held      Ther Activity                                       Gait Training                                       Modalities             LASER Sprain/strain    4 min  15w Sprain/strain    4 min  15w Sprain/strain    4 min  15w Sprain/ strain    4 min  15w Sprain/ strain    4 min  15w Sprain/ strain    4 min  15w held      IE/HEP JF               Daily Note     Today's date: 2023  Patient name: Letha Maloney  : 1990  MRN: [id number]  Referring provider: Devon Rutherford, *  Dx:   Encounter Diagnosis     ICD-10-CM    1. Chronic pain of right knee  M25.561     G89.29       2. Iliotibial band syndrome of right side  M76.31                      Subjective: Patient reports that he is having more discomfort in the glute and ITB area. Hard to sleep at night this week. Does note a more active weekend. Objective: See treatment diary below. Treatment as indicated below. Reassessed nerve tension and lumbar mobility  Peripheralization of symptoms to lateral thigh and knee with lumbar flexion  Centralization of symptoms to low back with lumbar extension (standing and prone)      Assessment: Patient appears to have a component of his symptoms coming from his spine. Noted mild nerve tension and a directional preference for extension. Added a standing/prone extension and sciatic nerve glides to patient's HEP, and instructed him to hold all other exercises for the time being. Also educated patient to remain active as much as possible. Continue to progress as tolerated.  Patient will continue to benefit from skilled PT in order to address impairments and improve function. Plan: Continue per plan of care. Progress treatment as tolerated. Precautions: n/a      Manuals  8/3      Fib head mob JF JF JF          Knee distraction moib             Lumbar PAs       nv- pending response to HEP                   Neuro Re-Ed             clamshells HEP BTB  2x10 BTB  2x10 BTB  2x10 BTB  2x10 BTB  2x10 BTB  2x10      Hip 3 ways HEP  [place]  2x10 Pink  2x10 Pink  2x10 Pink  2x10 held      Monster walk                                                                 Ther Ex             Bike  5 min 5 min 5 min 5 min 5 min 5 min      Side lying rev march  GTB  2x10 GTB  2x10 GTB  2x10 GTB  2x10 GTB  2x10 GTB  2x10      Leg press  3x10  75# 3x10  75# 3x10  75# 3x10 75# 3x10  75# held      Step ups  8"  2x10 8"  2x10 8"  2x10 8"  2x10 8"  2x10 8"  2x10      Supine ITB strap stretch     15"x5        LAQ  5#  3x10 5#  3x10 7.5#  3x10 7.5#  3x10 7.5#  3x10 held      Lunges             STS   BMB  2x10 BMB  2x10  staggered stance BMB  2x10  staggered stance BMB  2x10  staggered stance BMB  2x10  staggered stance      Wall ITB stretch      5x15" held      Ther Activity                                       Gait Training                                       Modalities             LASER Sprain/strain    4 min  15w Sprain/strain    4 min  15w Sprain/strain    4 min  15w Sprain/ strain    4 min  15w Sprain/ strain    4 min  15w Sprain/ strain    4 min  15w held      IE/HEP JF               Daily Note     Today's date: 2023  Patient name: Jesus Chang  : 1990  MRN: [id number]  Referring provider: Segundo Gonzalez, *  Dx:   Encounter Diagnosis     ICD-10-CM    1. Chronic pain of right knee  M25.561     G89.29       2. Iliotibial band syndrome of right side  M76.31                      Subjective: Patient reports that his symptoms are much better today. Reports compliance with HEP. Notes being able to stand at work for longer periods with less pain today. States that he has slept better the last two nights. Objective: See treatment diary below. Treatment as indicated below. Assessment: Patient was able to tolerate progression of resistance exercises without an increase in pain. They demonstrated muscular fatigue as expected with progression. The combination of glute strengthening and extension based lumbar exercises continues to be improving patient's symptoms and therefore improving his function. Continue to progress as tolerated. Patient will continue to benefit from skilled PT in order to address impairments and improve function. Plan: Continue per plan of care. Progress treatment as tolerated.        Precautions: n/a      Manuals 7/11 7/13 7/18 7/20 7/25 7/27 8/3 8/8 8/10    Fib head mob JF JF JF          ITB STM        JF JF  IASTM    Lumbar PAs       nv- pending response to HEP                   Neuro Re-Ed             clamshells HEP BTB  2x10 BTB  2x10 BTB  2x10 BTB  2x10 BTB  2x10 BTB  2x10 Purple  2x10 Purple  2x10    Hip 3 ways HEP  [place]  2x10 Pink  2x10 Pink  2x10 Pink  2x10 held  BTB  2x10    Monster walk             Prone press        10x10" 10x10"    Standing lumbar extension        x10 x10                              Ther Ex             Bike  5 min 5 min 5 min 5 min 5 min 5 min 5 min 5 min    Side lying rev march  GTB  2x10 GTB  2x10 GTB  2x10 GTB  2x10 GTB  2x10 GTB  2x10 GTB  2x10 GTB  2x10    Leg press  3x10  75# 3x10  75# 3x10  75# 3x10 75# 3x10  75# held 75#  3x10 75#  3x10    Step ups  8"  2x10 8"  2x10 8"  2x10 8"  2x10 8"  2x10 8"  2x10 8"  2x10 8"  2x10    Supine ITB strap stretch     15"x5        LAQ  5#  3x10 5#  3x10 7.5#  3x10 7.5#  3x10 7.5#  3x10 held held 7.5#  3x10    Lunges             STS   BMB  2x10 BMB  2x10  staggered stance BMB  2x10  staggered stance BMB  2x10  staggered stance BMB  2x10  staggered stance BMB  2x10  staggered stance BMB  2x10  staggered  stance    Wall ITB stretch      5x15" held      Ther Activity                                       Gait Training                                       Modalities             LASER Sprain/strain    4 min  15w Sprain/strain    4 min  15w Sprain/strain    4 min  15w Sprain/ strain    4 min  15w Sprain/ strain    4 min  15w Sprain/ strain    4 min  15w held      IE/HEP JF

## 2023-08-15 ENCOUNTER — OFFICE VISIT (OUTPATIENT)
Dept: PHYSICAL THERAPY | Facility: CLINIC | Age: 33
End: 2023-08-15

## 2023-08-15 DIAGNOSIS — M25.561 CHRONIC PAIN OF RIGHT KNEE: ICD-10-CM

## 2023-08-15 DIAGNOSIS — G89.29 CHRONIC PAIN OF RIGHT KNEE: ICD-10-CM

## 2023-08-15 DIAGNOSIS — M76.31 ILIOTIBIAL BAND SYNDROME OF RIGHT SIDE: Primary | ICD-10-CM

## 2023-08-15 PROCEDURE — 97112 NEUROMUSCULAR REEDUCATION: CPT | Performed by: PHYSICAL THERAPIST

## 2023-08-15 PROCEDURE — 97110 THERAPEUTIC EXERCISES: CPT | Performed by: PHYSICAL THERAPIST

## 2023-08-15 NOTE — PROGRESS NOTES
PT Re-evaluation & hold on care    Today's date: 8/15/2023   Patient name: Javon Gramajo  : 1990  MRN: [id number]  Referring provider: Ghassan White, *  Dx:   Encounter Diagnosis     ICD-10-CM    1. Chronic pain of right knee  M25.561     G89.29       2. Iliotibial band syndrome of right side  M76.31           Assessment/Plan  Patient is a 28 y.o. male presenting to OP PT R knee pain. They have attended 10 visits over 5 weeks. Since starting therapy pt has made the following progress towards goals:  1) Pain: Patient reports that their max pain has decreased to 7/10 from 10/10. Much less frequently   2) Range of motion: Patient's knee ROM improved WNL  3.)Strength: Patient's knee and hip strength has improved to at least 5/5  4) Self rated functional score: FOTO score has increase from 14 to 877 Oswaldo Junior has been compliant with attending PT and home exercise program since initial eval. he has made improvements in objective data and achieved desired functional goals. Patient reports having returned to their prior level or function. It was mutually agreed to place episode of care on hold for 30 days to trial home exercise program at this time. Patient has good understanding of provided home exercise program and was instructed to call with any questions or if issues should arise. See updated goals below. Goals  STG (3 weeks)  Pain: Patient will subjectively report maximum pain decreased by 2/10- MET  Strength: Patient's will demonstrate knee extension strength improved by 1/2 grade- MET  Function: Patient increase score on FOTO by 10 points- MET    LTG (6 weeks)  Pain: Patient will subjectively report less than 2/10 pain with functional activities. - progreesing  Strength: Patient's will demonstrate knee extension and glute strength improved to WNL- MET  Function: Patient will increase score on FOTO to predicted value or better- MET  Patient will be ind with HEP by DC    Plan  Plan details: hold 30 days to trial HEP  Treatment plan discussed with: patient    Subjective  UPDATE 8/15/2023: Patient reports that his knee is feeling much better. He notes over the last week being about to stand for  most of the day and notes that the day has been busier than normal. Less sitting at work. Patient notes that the pain is vastly improved. He feels that he can continues to improved with a HEP. IE (7/11/2023): Patient is a 34 y.o. male who presents for an initial outpatient physical therapy consultation regarding their R knee pain. Patient reports that his pain began about 7-8 years ago. Intermittent in nature. About 3 years ago, he was weight lifting, and it felt like it was getting worse. Consulted ortho, had an x-ray, MRI, and EMG, which were all negative. Previous PT for IT band got 90% better. Stopped doing his HEP because it was feeling better, but the pain came back. 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 Aggravating factors: Standing, driving, sitting kisha cross, weight lifting. Alleviating factors: ibuprofen, rest, foam roller, heat, ice  Functional limitations: ADLs, work  Consultations: Patient reports that they consulted orthopedics, and xrays were     Pain  Best: 3/10 (was 5/10)  Worst: 7/10 (was 10/10) less frequent  Irritability: minutes to hours. Location: lateral knee  Quality: achy  Progression: improved. Social Support  Employment status: pharmacist  Hobbies/Recreational Activities: weight lifting, running,     Diagnostic Tests  Per patient: had xray, MRI, and EMG which were all negative. Patient Goals for Therapy  "decreased pain"    Objective     Tenderness     Right Knee   Tenderness in the fibular head. No tenderness in the ITB, lateral joint line, lateral patella, lateral retinaculum, LCL (distal) and LCL (proximal).      8/15/2023: denies tenderness    Neurological Testing     Sensation     Knee   Left Knee Intact: light touch    Right Knee   Intact: light touch     Active Range of Motion   Left Knee   Normal active range of motion    Right Knee   Normal active range of motion    Mobility     Additional Mobility Details  Fibular head mobility: decreased    Strength/Myotome Testing     Left Hip   Planes of Motion   Flexion: 5/5  Abduction: 5/5    Right Hip   Planes of Motion   Flexion: 5/5 (was 4/5)  Abduction: 5/5 (was 4-/5)    Left Knee   Flexion: 5  Extension: 5    Right Knee   Flexion: 5/5 (was 4+/5)  Extension: 5/5 (was 4+/5)    Tests     Right Knee   Negative lateral Ivy, medial Ivy, valgus stress test at 30 degrees and varus stress test at 30 degrees.        Precautions: n/a    Manuals 7/11 7/13 7/18 7/20 7/25 7/27 8/3  8/15       Fib head mob JF JF JF                 Knee distraction moib                       Lumbar PAs             nv- pending response to HEP                                 Neuro Re-Ed                       clamshells HEP BTB  2x10 BTB  2x10 BTB  2x10 BTB  2x10 BTB  2x10 BTB  2x10  BTB  2x10       Hip 3 ways HEP   [place]  2x10 Pink  2x10 Pink  2x10 BTB  2x10 held  BTB  2x10       Monster walk                                                                                                                       Ther Ex                       Bike   5 min 5 min 5 min 5 min 5 min 5 min  5 min       Side lying rev march   GTB  2x10 GTB  2x10 GTB  2x10 GTB  2x10 GTB  2x10 GTB  2x10  GTB  2x10       Leg press   3x10  75# 3x10  75# 3x10  75# 3x10 75# 3x10  75# held  3x10  75#       Step ups   8"  2x10 8"  2x10 8"  2x10 8"  2x10 8"  2x10 8"  2x10  8"  2x10       Supine ITB strap stretch         15"x5             LAQ   5#  3x10 5#  3x10 7.5#  3x10 7.5#  3x10 7.5#  3x10 held         Lunges                       STS     BMB  2x10 BMB  2x10  staggered stance BMB  2x10  staggered stance BMB  2x10  staggered stance BMB  2x10  staggered stance  BMB  2x10  staggered stance       Wall ITB stretch           5x15" held         Ther Activity                                                                       Gait Training                                                                       Modalities                       LASER Sprain/strain     4 min  15w Sprain/strain     4 min  15w Sprain/strain     4 min  15w Sprain/ strain     4 min  15w Sprain/ strain     4 min  15w Sprain/ strain     4 min  15w held         IE/HEP JF

## 2023-08-17 ENCOUNTER — APPOINTMENT (OUTPATIENT)
Dept: PHYSICAL THERAPY | Facility: CLINIC | Age: 33
End: 2023-08-17

## 2024-01-24 ENCOUNTER — APPOINTMENT (OUTPATIENT)
Dept: PHYSICAL THERAPY | Facility: CLINIC | Age: 34
End: 2024-01-24

## 2024-01-26 ENCOUNTER — EVALUATION (OUTPATIENT)
Dept: PHYSICAL THERAPY | Facility: CLINIC | Age: 34
End: 2024-01-26

## 2024-01-26 DIAGNOSIS — M25.551 RIGHT HIP PAIN: ICD-10-CM

## 2024-01-26 DIAGNOSIS — M25.561 CHRONIC PAIN OF RIGHT KNEE: ICD-10-CM

## 2024-01-26 DIAGNOSIS — G89.29 CHRONIC PAIN OF RIGHT KNEE: ICD-10-CM

## 2024-01-26 DIAGNOSIS — M54.50 CHRONIC RIGHT-SIDED LOW BACK PAIN WITHOUT SCIATICA: Primary | ICD-10-CM

## 2024-01-26 DIAGNOSIS — G89.29 CHRONIC RIGHT-SIDED LOW BACK PAIN WITHOUT SCIATICA: Primary | ICD-10-CM

## 2024-01-26 PROCEDURE — 97161 PT EVAL LOW COMPLEX 20 MIN: CPT | Performed by: PHYSICAL THERAPIST

## 2024-01-26 NOTE — PROGRESS NOTES
PT Evaluation     Today's date: 2024  Patient name: Neha Romano  : 1990  MRN: [id number]  Referring provider: Tom Israel PT  Dx:   Encounter Diagnosis     ICD-10-CM    1. Chronic right-sided low back pain without sciatica  M54.50     G89.29       2. Right hip pain  M25.551       3. Chronic pain of right knee  M25.561     G89.29                      Assessment/Plan  Mr. Romano is a 33 y.o. male presenting direct access to outpatient physical therapy with R knee pain which restricts their ability to participate in ADLs, work, and recreational activities without pain. Functional limitations are result of the following impairments: decreased lumbar mobility, radiating pain to the R hip/knee, and TTP lumbar paraspinals. Symptoms are consistent with pathoanatomical diagnosis is lumbar hypomobility with radiating pain. PMH is unremarkable. No further referral appears necessary at this time based upon examination results    Patient was given the opportunity to ask questions, and all questions were answered to the patient's satisfaction. The patients's greatest concerns are the pain not getting better, not being able to care for self or others, and getting back to recreational activity or sport. Patient appears motivated, agrees with the POC, and is a good candidate for skilled physical therapy at this time. Patient was provided with handout of HEP consisting of lumbar mobility    Symptom irritability: Low   Understanding of Dx/Px/POC: good  Prognosis: good  Negative prognostic indicators include: chronicity    Goals  STG (3 weeks)  Pain: Patient will subjectively report maximum pain decreased by 2/10  ROM: Patient will increase lumbar ROM by 25%  Function: Patient increase score on FOTO by 10 points    LTG (6 weeks)  Pain: Patient will subjectively report less than 2/10 pain with functional activities.  ROM: Patient will increase lumbar ROM by at least 50%  Function: Patient will increase score on  FOTO to predicted value or better  Patient will be ind with HEP by DC    Plan  Plan details: Prognosis above is given PT services 1x/week over the next 30 days and home program adherence.   Patient would benefit from: skilled physical therapy, home exercise program  Referral necessary: no  Planned modality interventions: Hydrocollator packs, Cryotherapy, TENS, Low level laser, Traction, and Cupping  Planned therapy interventions: joint mobilization, manual therapy, massage, neuromuscular re-education, patient education, stretching, strengthening, therapeutic activities, therapeutic exercise, home exercise program, functional ROM exercises, gait training, flexibility, balance, abdominal trunk stabilization, motor coordination training, coordination and behavior modification  Frequency: 1x/week for 30 days (DA)  Plan of Care beginning date: 1/26/2024   Plan of Care expiration date: 2/23/2024  Treatment plan discussed with: patient    Subjective  IE (1/26/2024): Patient is a 33 y.o. male who presents for an initial outpatient physical therapy consultation regarding their R knee pain. Patient previously treated by this therapist over the summer. He states that he stopped doing  his exercises after a few weeks. The pain started to come back a little bit, in his low back, lateral hip, and lateral knee. He began to do his HEP again, but then was set back from needing to snow shovel. Feels that shoveling flared up his back. Feels tension in his when laying in bed, on back or on R side. Pain is worse with being on his feet all day at work.     Aggravating factors: Standing for more than a few hours. By the end of the day. Driving.   Alleviating factors: exercises, OTC meds, rest  Functional limitations: ADLs, work, recreations    Pain  Best: 3/10  Worst: 9/10  Irritability: minutes to hours  Location: back, R hip, R knee  Quality: sharp and burning  Progression: worsening    Social Support  Employment status: Pharmacist, on  "feet all day.       Patient Goals for Therapy  \"Decrease the pain. \"    Objective     Concurrent Complaints  Negative for night pain, disturbed sleep, bladder dysfunction, bowel dysfunction, saddle (S4) numbness, cardiac problem, kidney problem, gallbladder problem, stomach problem, ulcer, appendix problem, spleen problem, pancreas problem, history of cancer, history of trauma and infection    Postural Observations  Seated posture: fair  Standing posture: fair      Palpation     Additional Palpation Details  TTP lumbar paraspinals    Neurological Testing     Sensation     Lumbar   Left   Intact: light touch    Right   Intact: light touch    Active Range of Motion   Cervical/Thoracic Spine       Thoracic    Flexion:  Restriction level: minimal  Extension:  Restriction level: moderate    Lumbar   Flexion:  Restriction level: minimal  Extension:  Restriction level: moderate  Left rotation:  Restriction level: minimal  Right rotation:  Restriction level: minimal  Mechanical Assessment    Cervical      Thoracic      Lumbar    Standing flexion: repeated movements   Pain location:no change  Pain intensity: worse  Pain level: increased  Standing extension: repeated movements  Pain location: centralized  Pain intensity: better  Pain level: decreased    Strength/Myotome Testing     Additional Strength Details  Myotomal screen unremarkable    Tests     Lumbar     Left   Negative crossed SLR and passive SLR.     Right   Negative crossed SLR, passive SLR and slump test.     Left Hip   Negative FADIR.     Right Hip   Positive FADIR.              Precautions: n/a      Manuals 1/26                                                                Neuro Re-Ed             Sciatic sequence HEP            Prone press HEP                                                                             Ther Ex             LTR HEP            Standing lumbar extension HEP            Seated thoracic extension HEP            Seated thoracic rotation " HEP                                                                Ther Activity                                       Gait Training                                       Modalities                          IE/HEP JF

## 2024-02-02 ENCOUNTER — APPOINTMENT (OUTPATIENT)
Dept: PHYSICAL THERAPY | Facility: CLINIC | Age: 34
End: 2024-02-02

## 2024-02-09 ENCOUNTER — OFFICE VISIT (OUTPATIENT)
Dept: PHYSICAL THERAPY | Facility: CLINIC | Age: 34
End: 2024-02-09

## 2024-02-09 DIAGNOSIS — G89.29 CHRONIC PAIN OF RIGHT KNEE: ICD-10-CM

## 2024-02-09 DIAGNOSIS — M25.551 RIGHT HIP PAIN: ICD-10-CM

## 2024-02-09 DIAGNOSIS — M25.561 CHRONIC PAIN OF RIGHT KNEE: ICD-10-CM

## 2024-02-09 DIAGNOSIS — M54.50 CHRONIC RIGHT-SIDED LOW BACK PAIN WITHOUT SCIATICA: Primary | ICD-10-CM

## 2024-02-09 DIAGNOSIS — G89.29 CHRONIC RIGHT-SIDED LOW BACK PAIN WITHOUT SCIATICA: Primary | ICD-10-CM

## 2024-02-09 PROCEDURE — 97110 THERAPEUTIC EXERCISES: CPT | Performed by: PHYSICAL THERAPIST

## 2024-02-09 PROCEDURE — 97112 NEUROMUSCULAR REEDUCATION: CPT | Performed by: PHYSICAL THERAPIST

## 2024-02-09 NOTE — PROGRESS NOTES
"Daily Note     Today's date: 2024  Patient name: Neha Romano  : 1990  MRN: [id number]  Referring provider: Tom Israel, PT  Dx:   Encounter Diagnosis     ICD-10-CM    1. Chronic right-sided low back pain without sciatica  M54.50     G89.29       2. Chronic pain of right knee  M25.561     G89.29       3. Right hip pain  M25.551                      Subjective: Patient reports that his leg pain is improving, still feeling the lateral hip pain. Has been consistent with his exercises, but does note that he has been getting a lot of hamstring fatigue with the supine HS stretch. Unable to complete 10 reps in the a row without fatigue      Objective: See treatment diary below      Assessment: Reviewed HEP to ensure proper form and to answer any questions regarding prescribed exercises. Patient demonstrates good recall and form with minimal cuing. Patient able to tolerate all exercises as prescribed. Adjusted patient's HEP to change the HS stretch. Continue to progress as tolerated. Patient will continue to benefit from skilled PT in order to address impairments and improve function.     Plan: Continue per plan of care.  Progress treatment as tolerated.       Precautions: n/a      Manuals            R hip PROM  JF                                                  Neuro Re-Ed             Sciatic sequence HEP            Prone press HEP 10x10\"           clamshells  BTB  2x10           S/L hip extension  GTB  3x10           Bridges  BTB  2x10                                     Ther Ex             LTR HEP 10x10\"           Standing lumbar extension HEP 10x10\"           Seated thoracic extension HEP            Seated thoracic rotation HEP            Seated HS stretch  10x10\"           Standing nerve glide on step  10x10\"           ITB stretch  10x10\"           DB deadlift  10# DB    2x10           Ther Activity                                       Gait Training                                     "   Modalities                          IE/HEP JF

## 2024-02-23 ENCOUNTER — OFFICE VISIT (OUTPATIENT)
Dept: PHYSICAL THERAPY | Facility: CLINIC | Age: 34
End: 2024-02-23

## 2024-02-23 DIAGNOSIS — M25.561 CHRONIC PAIN OF RIGHT KNEE: ICD-10-CM

## 2024-02-23 DIAGNOSIS — G89.29 CHRONIC PAIN OF RIGHT KNEE: ICD-10-CM

## 2024-02-23 DIAGNOSIS — G89.29 CHRONIC RIGHT-SIDED LOW BACK PAIN WITHOUT SCIATICA: Primary | ICD-10-CM

## 2024-02-23 DIAGNOSIS — M54.50 CHRONIC RIGHT-SIDED LOW BACK PAIN WITHOUT SCIATICA: Primary | ICD-10-CM

## 2024-02-23 DIAGNOSIS — M25.551 RIGHT HIP PAIN: ICD-10-CM

## 2024-02-23 PROCEDURE — 97112 NEUROMUSCULAR REEDUCATION: CPT | Performed by: PHYSICAL THERAPIST

## 2024-02-23 PROCEDURE — 97110 THERAPEUTIC EXERCISES: CPT | Performed by: PHYSICAL THERAPIST

## 2024-02-23 NOTE — PROGRESS NOTES
"Daily Note     Today's date: 2024  Patient name: Neha Romano  : 1990  MRN: [id number]  Referring provider: Tom Israel, PT  Dx:   Encounter Diagnosis     ICD-10-CM    1. Chronic right-sided low back pain without sciatica  M54.50     G89.29       2. Chronic pain of right knee  M25.561     G89.29       3. Right hip pain  M25.551                        Subjective: Patient reports that his leg pain is getting better overall, but it continues to be there. He notes that he hasn't been able to focus 100% on the HEP over the last week because of events in his family. He states that his back continues to feel stiff at times, but that Biofreeze has been helping.       Objective: See treatment diary below      Assessment: Patient was able to tolerate progression of resistance exercises without an increase in pain. They demonstrated muscular fatigue as expected with progression.  Functional hip and back strength improving. Continue to progress as tolerated. Patient will continue to benefit from skilled PT in order to address impairments and improve function.     Plan: Continue per plan of care.  Progress treatment as tolerated.       Precautions: n/a      Manuals           R hip PROM  JF JF                                                 Neuro Re-Ed             Sciatic sequence HEP            Prone press HEP 10x10\" 10x10\"          clamshells  BTB  2x10 Purple  2x10          S/L hip extension  GTB  3x10           Bridges  BTB  2x10 Purple  2x10                                    Ther Ex             LTR HEP 10x10\" 10x10\"          Standing lumbar extension HEP 10x10\" 10x10\"          Seated thoracic extension HEP            Seated thoracic rotation HEP            Seated HS stretch  10x10\"           Standing nerve glide on step  10x10\" 10x10\"          ITB stretch  10x10\" 10x10\"          DB deadlift  10# DB    2x10 15# DB  2x10          MH ABD   25#  2x10 ea          Leg press   75#  3x10        "   Ther Activity                                       Gait Training                                       Modalities                          IE/HEP JF

## 2024-03-05 ENCOUNTER — OFFICE VISIT (OUTPATIENT)
Dept: PHYSICAL THERAPY | Facility: CLINIC | Age: 34
End: 2024-03-05

## 2024-03-05 DIAGNOSIS — M54.50 CHRONIC RIGHT-SIDED LOW BACK PAIN WITHOUT SCIATICA: Primary | ICD-10-CM

## 2024-03-05 DIAGNOSIS — G89.29 CHRONIC RIGHT-SIDED LOW BACK PAIN WITHOUT SCIATICA: Primary | ICD-10-CM

## 2024-03-05 DIAGNOSIS — G89.29 CHRONIC PAIN OF RIGHT KNEE: ICD-10-CM

## 2024-03-05 DIAGNOSIS — M25.561 CHRONIC PAIN OF RIGHT KNEE: ICD-10-CM

## 2024-03-05 DIAGNOSIS — M25.551 RIGHT HIP PAIN: ICD-10-CM

## 2024-03-05 PROCEDURE — 97110 THERAPEUTIC EXERCISES: CPT | Performed by: PHYSICAL THERAPIST

## 2024-03-05 PROCEDURE — 97112 NEUROMUSCULAR REEDUCATION: CPT | Performed by: PHYSICAL THERAPIST

## 2024-03-05 NOTE — PROGRESS NOTES
"Daily Note     Today's date: 3/5/2024  Patient name: Neha Romano  : 1990  MRN: [id number]  Referring provider: Bobby Mcdowell, *  Dx:   Encounter Diagnosis     ICD-10-CM    1. Chronic right-sided low back pain without sciatica  M54.50     G89.29       2. Chronic pain of right knee  M25.561     G89.29       3. Right hip pain  M25.551                        Subjective: Patient reports that his leg pain continues to get better, but he does note that he gets occasionally lateral knee pain and R gluteal pain. He states that he has been consistent with HEP for the last week and has noticed an improvement of symptoms with the extension based exercises.       Objective: See treatment diary below.   Lumbar ROM: 25% improvement  Pain: 2-5/10 (was 3-/10)    Goal update  STG (3 weeks)  Pain: Patient will subjectively report maximum pain decreased by 2/10- MET  ROM: Patient will increase lumbar ROM by 25%- MET  Function: Patient increase score on FOTO by 10 points- MET    LTG (6 weeks)  Pain: Patient will subjectively report less than 2/10 pain with functional activities.- progressing  ROM: Patient will increase lumbar ROM by at least 50%- progressing  Function: Patient will increase score on FOTO to predicted value or better- progressing  Patient will be ind with HEP by DC- progressing    Assessment: Patient has attended 4 visits over 5 weeks. He is progressing well at this time. He continues to have limitation with lumbar and R hip ROM, R glute strength, R sciatic nerve tension, and pain with function. Patient will continue to benefit from skilled PT in order to address impairments and improve function.       Plan: Patient would benefit from continuing skilled PT, 2x/week for 4 weeks.      Precautions: n/a      Manuals 1/26 2/9 2/23 3/5         R hip PROM  JF JF JF                                                Neuro Re-Ed             Sciatic sequence HEP            Prone press HEP 10x10\" 10x10\" 10x10\"     " "    clamshells  BTB  2x10 Purple  2x10 Purple  2x10         S/L hip extension  GTB  3x10           Bridges  BTB  2x10 Purple  2x10 Purple  2x10                                   Ther Ex             LTR HEP 10x10\" 10x10\" 10x10\"         Standing lumbar extension HEP 10x10\" 10x10\" 10x10\"         Seated thoracic extension HEP            Seated thoracic rotation HEP            Seated HS stretch  10x10\"           Standing nerve glide on step  10x10\" 10x10\" 10x10\"         ITB stretch  10x10\" 10x10\" 10x10\"         DB deadlift  10# DB    2x10 15# DB  2x10 15#  DB  2x10         MH ABD   25#  2x10 ea 25#  2x10 ea         Leg press   75#  3x10 75#  3x10         Ther Activity                                       Gait Training                                       Modalities                          IE/HEP JF                 "

## 2024-03-05 NOTE — LETTER
2024    Bobby Shar July, DO  [address] [place] [address]    Patient: Neha Romano   YOB: 1990   Date of Visit: 3/5/2024     Encounter Diagnosis     ICD-10-CM    1. Chronic right-sided low back pain without sciatica  M54.50     G89.29       2. Chronic pain of right knee  M25.561     G89.29       3. Right hip pain  M25.551           Dear Dr. Mcdowell:    Thank you for your recent referral of Neha Romano. Please review the attached evaluation summary from Neha's recent visit.     Please verify that you agree with the plan of care by signing the attached order.     If you have any questions or concerns, please do not hesitate to call.     I sincerely appreciate the opportunity to share in the care of one of your patients and hope to have another opportunity to work with you in the near future.       Sincerely,    Tom Israel, PT      Referring Provider:      I certify that I have read the below Plan of Care and certify the need for these services furnished under this plan of treatment while under my care.                    Bobby Mcdowell, DO  [address] [place] [address]  Via Fax: [phone]          Daily Note     Today's date: 3/5/2024  Patient name: Neha Romano  : 1990  MRN: [id number]  Referring provider: Bobby Mcdowell, *  Dx:   Encounter Diagnosis     ICD-10-CM    1. Chronic right-sided low back pain without sciatica  M54.50     G89.29       2. Chronic pain of right knee  M25.561     G89.29       3. Right hip pain  M25.551                        Subjective: Patient reports that his leg pain continues to get better, but he does note that he gets occasionally lateral knee pain and R gluteal pain. He states that he has been consistent with HEP for the last week and has noticed an improvement of symptoms with the extension based exercises.       Objective: See treatment diary below.   Lumbar ROM: 25% improvement  Pain:  "2-5/10 (was 3-9/10)    Goal update  STG (3 weeks)  Pain: Patient will subjectively report maximum pain decreased by 2/10- MET  ROM: Patient will increase lumbar ROM by 25%- MET  Function: Patient increase score on FOTO by 10 points- MET    LTG (6 weeks)  Pain: Patient will subjectively report less than 2/10 pain with functional activities.- progressing  ROM: Patient will increase lumbar ROM by at least 50%- progressing  Function: Patient will increase score on FOTO to predicted value or better- progressing  Patient will be ind with HEP by DC- progressing    Assessment: Patient has attended 4 visits over 5 weeks. He is progressing well at this time. He continues to have limitation with lumbar and R hip ROM, R glute strength, R sciatic nerve tension, and pain with function. Patient will continue to benefit from skilled PT in order to address impairments and improve function.       Plan: Patient would benefit from continuing skilled PT, 2x/week for 4 weeks.      Precautions: n/a      Manuals 1/26 2/9 2/23 3/5         R hip PROM  JF JF JF                                                Neuro Re-Ed             Sciatic sequence HEP            Prone press HEP 10x10\" 10x10\" 10x10\"         clamshells  BTB  2x10 Purple  2x10 Purple  2x10         S/L hip extension  GTB  3x10           Bridges  BTB  2x10 Purple  2x10 Purple  2x10                                   Ther Ex             LTR HEP 10x10\" 10x10\" 10x10\"         Standing lumbar extension HEP 10x10\" 10x10\" 10x10\"         Seated thoracic extension HEP            Seated thoracic rotation HEP            Seated HS stretch  10x10\"           Standing nerve glide on step  10x10\" 10x10\" 10x10\"         ITB stretch  10x10\" 10x10\" 10x10\"         DB deadlift  10# DB    2x10 15# DB  2x10 15#  DB  2x10         MH ABD   25#  2x10 ea 25#  2x10 ea         Leg press   75#  3x10 75#  3x10         Ther Activity                                       Gait Training                                "        Modalities                          IE/HEP JF

## 2024-03-07 ENCOUNTER — OFFICE VISIT (OUTPATIENT)
Dept: PHYSICAL THERAPY | Facility: CLINIC | Age: 34
End: 2024-03-07

## 2024-03-07 DIAGNOSIS — M25.551 RIGHT HIP PAIN: ICD-10-CM

## 2024-03-07 DIAGNOSIS — M25.561 CHRONIC PAIN OF RIGHT KNEE: ICD-10-CM

## 2024-03-07 DIAGNOSIS — G89.29 CHRONIC RIGHT-SIDED LOW BACK PAIN WITHOUT SCIATICA: Primary | ICD-10-CM

## 2024-03-07 DIAGNOSIS — M54.50 CHRONIC RIGHT-SIDED LOW BACK PAIN WITHOUT SCIATICA: Primary | ICD-10-CM

## 2024-03-07 DIAGNOSIS — G89.29 CHRONIC PAIN OF RIGHT KNEE: ICD-10-CM

## 2024-03-07 PROCEDURE — 97110 THERAPEUTIC EXERCISES: CPT | Performed by: PHYSICAL THERAPIST

## 2024-03-07 PROCEDURE — 97112 NEUROMUSCULAR REEDUCATION: CPT | Performed by: PHYSICAL THERAPIST

## 2024-03-07 NOTE — PROGRESS NOTES
"Daily Note     Today's date: 3/7/2024  Patient name: Neha Romano  : 1990  MRN: [id number]  Referring provider: Bobby Mcdowell, *  Dx:   Encounter Diagnosis     ICD-10-CM    1. Chronic right-sided low back pain without sciatica  M54.50     G89.29       2. Chronic pain of right knee  M25.561     G89.29       3. Right hip pain  M25.551                        Subjective: Patient reports that his leg pain continues to get better. Pain is now more in the upper glute and LBP      Objective: See treatment diary below.     Assessment: Patient was able to tolerate progression of resistance exercises without an increase in pain. They demonstrated muscular fatigue as expected with progression. Functional glute stretching improving. Patient will continue to benefit from skilled PT in order to address impairments and improve function.       Plan: Continue per POC     Precautions: n/a      Manuals 1/26 2/9 2/23 3/5 3/7        R hip PROM  JF JF JF JF                                               Neuro Re-Ed             Sciatic sequence HEP            Prone press HEP 10x10\" 10x10\" 10x10\" 10x10\"        clamshells  BTB  2x10 Purple  2x10 Purple  2x10 Purple  2x10        S/L hip extension  GTB  3x10   Leaning on table    2x10        Bridges  BTB  2x10 Purple  2x10 Purple  2x10 Purple  2x10                                  Ther Ex             LTR HEP 10x10\" 10x10\" 10x10\" 10x10\"        Standing lumbar extension HEP 10x10\" 10x10\" 10x10\" 10x10\"        Seated thoracic extension HEP            Seated thoracic rotation HEP            Seated HS stretch  10x10\"           Standing nerve glide on step  10x10\" 10x10\" 10x10\" 10x10\"        ITB stretch  10x10\" 10x10\" 10x10\" 10x10\"        DB deadlift  10# DB    2x10 15# DB  2x10 15#  DB  2x10 20# DB  2x10        MH ABD   25#  2x10 ea 25#  2x10 ea 25#  2x10 ea        Leg press   75#  3x10 75#  3x10 85#   3x10        Ther Activity                                       Gait Training   "                                     Modalities                          IE/HEP JF

## 2024-03-11 ENCOUNTER — OFFICE VISIT (OUTPATIENT)
Dept: PHYSICAL THERAPY | Facility: CLINIC | Age: 34
End: 2024-03-11

## 2024-03-11 DIAGNOSIS — M54.50 CHRONIC RIGHT-SIDED LOW BACK PAIN WITHOUT SCIATICA: Primary | ICD-10-CM

## 2024-03-11 DIAGNOSIS — G89.29 CHRONIC PAIN OF RIGHT KNEE: ICD-10-CM

## 2024-03-11 DIAGNOSIS — M25.551 RIGHT HIP PAIN: ICD-10-CM

## 2024-03-11 DIAGNOSIS — M25.561 CHRONIC PAIN OF RIGHT KNEE: ICD-10-CM

## 2024-03-11 DIAGNOSIS — G89.29 CHRONIC RIGHT-SIDED LOW BACK PAIN WITHOUT SCIATICA: Primary | ICD-10-CM

## 2024-03-11 PROCEDURE — 97110 THERAPEUTIC EXERCISES: CPT | Performed by: PHYSICAL THERAPIST

## 2024-03-11 PROCEDURE — 97112 NEUROMUSCULAR REEDUCATION: CPT | Performed by: PHYSICAL THERAPIST

## 2024-03-11 NOTE — PROGRESS NOTES
"Daily Note     Today's date: 3/11/2024  Patient name: Neha Romano  : 1990  MRN: [id number]  Referring provider: Bobby Mcdowell, *  Dx:   Encounter Diagnosis     ICD-10-CM    1. Chronic right-sided low back pain without sciatica  M54.50     G89.29       2. Chronic pain of right knee  M25.561     G89.29       3. Right hip pain  M25.551                          Subjective: Patient reports that he continues to have difficulty sleeping on the R side, but overall it is getting better.       Objective: See treatment diary below.     Assessment: Patient reported improvement of ITB and glute pain with manual therapy. Functional strength continues to improve. He remains appropriately challenged by current exercise program. Patient will continue to benefit from skilled PT in order to address impairments and improve function.       Plan: Continue per POC     Precautions: n/a      Manuals 1/26 2/9 2/23 3/5 3/7 3/11       R hip PROM  JF JF JF JF JF                                              Neuro Re-Ed             Sciatic sequence HEP            Prone press HEP 10x10\" 10x10\" 10x10\" 10x10\" 10x10\"       clamshells  BTB  2x10 Purple  2x10 Purple  2x10 Purple  2x10 Purple  2x10       S/L hip extension  GTB  3x10   Leaning on table    2x10 Leaning on table    2x10       Bridges  BTB  2x10 Purple  2x10 Purple  2x10 Purple  2x10 Purple  2x10                                 Ther Ex             LTR HEP 10x10\" 10x10\" 10x10\" 10x10\" 10x10\"       Standing lumbar extension HEP 10x10\" 10x10\" 10x10\" 10x10\" 10x10\"       Seated thoracic extension HEP            Seated thoracic rotation HEP            Seated HS stretch  10x10\"           Standing nerve glide on step  10x10\" 10x10\" 10x10\" 10x10\" 10x10\"       ITB stretch  10x10\" 10x10\" 10x10\" 10x10\" 10x10\"       DB deadlift  10# DB    2x10 15# DB  2x10 15#  DB  2x10 20# DB  2x10 20# DB  2x10       MH ABD   25#  2x10 ea 25#  2x10 ea 25#  2x10 ea 25#  2x10ea       Leg press   " 75#  3x10 75#  3x10 85#   3x10 85#  3x10       Ther Activity                                       Gait Training                                       Modalities                          IE/HEP JF

## 2024-03-14 ENCOUNTER — OFFICE VISIT (OUTPATIENT)
Dept: PHYSICAL THERAPY | Facility: CLINIC | Age: 34
End: 2024-03-14

## 2024-03-14 DIAGNOSIS — M25.551 RIGHT HIP PAIN: ICD-10-CM

## 2024-03-14 DIAGNOSIS — M25.561 CHRONIC PAIN OF RIGHT KNEE: ICD-10-CM

## 2024-03-14 DIAGNOSIS — M54.50 CHRONIC RIGHT-SIDED LOW BACK PAIN WITHOUT SCIATICA: Primary | ICD-10-CM

## 2024-03-14 DIAGNOSIS — G89.29 CHRONIC RIGHT-SIDED LOW BACK PAIN WITHOUT SCIATICA: Primary | ICD-10-CM

## 2024-03-14 DIAGNOSIS — G89.29 CHRONIC PAIN OF RIGHT KNEE: ICD-10-CM

## 2024-03-14 PROCEDURE — 97110 THERAPEUTIC EXERCISES: CPT | Performed by: PHYSICAL THERAPIST

## 2024-03-14 PROCEDURE — 97112 NEUROMUSCULAR REEDUCATION: CPT | Performed by: PHYSICAL THERAPIST

## 2024-03-14 NOTE — PROGRESS NOTES
"Daily Note     Today's date: 3/14/2024  Patient name: Neha Romano  : 1990  MRN: [id number]  Referring provider: Bobby Mcdowell, *  Dx:   Encounter Diagnosis     ICD-10-CM    1. Chronic right-sided low back pain without sciatica  M54.50     G89.29       2. Chronic pain of right knee  M25.561     G89.29       3. Right hip pain  M25.551                          Subjective: Patient reports that his back was more sore over the last two days following last session.       Objective: See treatment diary below.     Assessment: Discussed DOMS and response to exercises. Held progression due to soreness between session. Patient reported improvement of ITB and glute pain with manual therapy. Functional strength continues to improve. He remains appropriately challenged by current exercise program. Patient will continue to benefit from skilled PT in order to address impairments and improve function.       Plan: Continue per POC     Precautions: n/a      Manuals 1/26 2/9 2/23 3/5 3/7 3/11 3/14      R hip PROM  JF JF JF JF JF JF                                             Neuro Re-Ed             Sciatic sequence HEP            Prone press HEP 10x10\" 10x10\" 10x10\" 10x10\" 10x10\" 10x10\"      clamshells  BTB  2x10 Purple  2x10 Purple  2x10 Purple  2x10 Purple  2x10 Purple  2x10      S/L hip extension  GTB  3x10   Leaning on table    2x10 Leaning on table    2x10 Leaning on table   2x10      Bridges  BTB  2x10 Purple  2x10 Purple  2x10 Purple  2x10 Purple  2x10 Purple  2x10                                Ther Ex             LTR HEP 10x10\" 10x10\" 10x10\" 10x10\" 10x10\" 10x10\"      Standing lumbar extension HEP 10x10\" 10x10\" 10x10\" 10x10\" 10x10\" 10x10\"      Seated thoracic extension HEP            Seated thoracic rotation HEP            Seated HS stretch  10x10\"           Standing nerve glide on step  10x10\" 10x10\" 10x10\" 10x10\" 10x10\" 10x10\"      ITB stretch  10x10\" 10x10\" 10x10\" 10x10\" 10x10\" 10x10\"      DB deadlift  " 10# DB    2x10 15# DB  2x10 15#  DB  2x10 20# DB  2x10 20# DB  2x10 20# DB  2x10      MH ABD   25#  2x10 ea 25#  2x10 ea 25#  2x10 ea 25#  2x10ea 25#  2x10 ea      Leg press   75#  3x10 75#  3x10 85#   3x10 85#  3x10 85#  3x10      Ther Activity                                       Gait Training                                       Modalities                          IE/HEP JF

## 2024-03-19 ENCOUNTER — OFFICE VISIT (OUTPATIENT)
Dept: PHYSICAL THERAPY | Facility: CLINIC | Age: 34
End: 2024-03-19

## 2024-03-19 DIAGNOSIS — M25.561 CHRONIC PAIN OF RIGHT KNEE: ICD-10-CM

## 2024-03-19 DIAGNOSIS — G89.29 CHRONIC RIGHT-SIDED LOW BACK PAIN WITHOUT SCIATICA: Primary | ICD-10-CM

## 2024-03-19 DIAGNOSIS — G89.29 CHRONIC PAIN OF RIGHT KNEE: ICD-10-CM

## 2024-03-19 DIAGNOSIS — M25.551 RIGHT HIP PAIN: ICD-10-CM

## 2024-03-19 DIAGNOSIS — M54.50 CHRONIC RIGHT-SIDED LOW BACK PAIN WITHOUT SCIATICA: Primary | ICD-10-CM

## 2024-03-19 PROCEDURE — 97112 NEUROMUSCULAR REEDUCATION: CPT | Performed by: PHYSICAL THERAPIST

## 2024-03-19 PROCEDURE — 97110 THERAPEUTIC EXERCISES: CPT | Performed by: PHYSICAL THERAPIST

## 2024-03-19 NOTE — PROGRESS NOTES
"Daily Note     Today's date: 3/19/2024  Patient name: Neha Romano  : 1990  MRN: [id number]  Referring provider: Bobby Mcdowell, *  Dx:   Encounter Diagnosis     ICD-10-CM    1. Chronic right-sided low back pain without sciatica  M54.50     G89.29       2. Chronic pain of right knee  M25.561     G89.29       3. Right hip pain  M25.551                          Subjective: Patient reports that his back has been more sore, but his leg pain is getting better. Does note increased leg pain following a 6 hour round trip drive over the weekend.       Objective: See treatment diary below.     Assessment: Tolerated exercise well.  Patient reported improvement of ITB and glute pain with manual therapy. Functional strength continues to improve. He remains appropriately challenged by current exercise program. Patient will continue to benefit from skilled PT in order to address impairments and improve function.       Plan: Continue per POC     Precautions: n/a      Manuals 1/26 2/9 2/23 3/5 3/7 3/11 3/14 3/19     R hip PROM  JF JF JF JF JF JF JF                                            Neuro Re-Ed             Sciatic sequence HEP            Prone press HEP 10x10\" 10x10\" 10x10\" 10x10\" 10x10\" 10x10\" 10x10\"     clamshells  BTB  2x10 Purple  2x10 Purple  2x10 Purple  2x10 Purple  2x10 Purple  2x10 Purple  2x10     S/L hip extension  GTB  3x10   Leaning on table    2x10 Leaning on table    2x10 Leaning on table   2x10 Leaning on table   2x10     Bridges  BTB  2x10 Purple  2x10 Purple  2x10 Purple  2x10 Purple  2x10 Purple  2x10 Purple  2x10                               Ther Ex             LTR HEP 10x10\" 10x10\" 10x10\" 10x10\" 10x10\" 10x10\" 10x10\"     Standing lumbar extension HEP 10x10\" 10x10\" 10x10\" 10x10\" 10x10\" 10x10\" 10x10\"     Seated thoracic extension HEP            Seated thoracic rotation HEP            Seated HS stretch  10x10\"           Standing nerve glide on step  10x10\" 10x10\" 10x10\" 10x10\" 10x10\" " "10x10\" 10x10\"     ITB stretch  10x10\" 10x10\" 10x10\" 10x10\" 10x10\" 10x10\" 10x10\"     DB deadlift  10# DB    2x10 15# DB  2x10 15#  DB  2x10 20# DB  2x10 20# DB  2x10 20# DB  2x10 20# DB  2x10     MH ABD   25#  2x10 ea 25#  2x10 ea 25#  2x10 ea 25#  2x10ea 25#  2x10 ea 25#  2x10 ea     Leg press   75#  3x10 75#  3x10 85#   3x10 85#  3x10 85#  3x10 85#  3x10     Ther Activity                                       Gait Training                                       Modalities                          IE/HEP JF                 "

## 2024-03-21 ENCOUNTER — OFFICE VISIT (OUTPATIENT)
Dept: PHYSICAL THERAPY | Facility: CLINIC | Age: 34
End: 2024-03-21

## 2024-03-21 DIAGNOSIS — M25.561 CHRONIC PAIN OF RIGHT KNEE: ICD-10-CM

## 2024-03-21 DIAGNOSIS — G89.29 CHRONIC RIGHT-SIDED LOW BACK PAIN WITHOUT SCIATICA: Primary | ICD-10-CM

## 2024-03-21 DIAGNOSIS — G89.29 CHRONIC PAIN OF RIGHT KNEE: ICD-10-CM

## 2024-03-21 DIAGNOSIS — M54.50 CHRONIC RIGHT-SIDED LOW BACK PAIN WITHOUT SCIATICA: Primary | ICD-10-CM

## 2024-03-21 PROCEDURE — 97112 NEUROMUSCULAR REEDUCATION: CPT | Performed by: PHYSICAL THERAPIST

## 2024-03-21 PROCEDURE — 97110 THERAPEUTIC EXERCISES: CPT | Performed by: PHYSICAL THERAPIST

## 2024-03-21 NOTE — PROGRESS NOTES
"Daily Note     Today's date: 3/21/2024  Patient name: Neha Romano  : 1990  MRN: [id number]  Referring provider: Bobby Mcdowell, *  Dx:   Encounter Diagnosis     ICD-10-CM    1. Chronic right-sided low back pain without sciatica  M54.50     G89.29       2. Chronic pain of right knee  M25.561     G89.29                          Subjective: Patient reports that he has noticed improvement in his leg pain. Back continues to be more, but its also better.       Objective: See treatment diary below.     Assessment: Patient trending in the right direction. Functional glute strength improving. He remains appropriately challenged by current exercise program. Patient will continue to benefit from skilled PT in order to address impairments and improve function.       Plan: Continue per POC     Precautions: n/a      Manuals 1/26 2/9 2/23 3/5 3/7 3/11 3/14 3/19 3/21    R hip PROM  JF JF JF JF JF JF DEMARIO JF                                           Neuro Re-Ed             Sciatic sequence HEP            Prone press HEP 10x10\" 10x10\" 10x10\" 10x10\" 10x10\" 10x10\" 10x10\" 10x10\"    clamshells  BTB  2x10 Purple  2x10 Purple  2x10 Purple  2x10 Purple  2x10 Purple  2x10 Purple  2x10 Purple  2x10    S/L hip extension  GTB  3x10   Leaning on table    2x10 Leaning on table    2x10 Leaning on table   2x10 Leaning on table   2x10 Leaning on table  2x10    Bridges  BTB  2x10 Purple  2x10 Purple  2x10 Purple  2x10 Purple  2x10 Purple  2x10 Purple  2x10 Purple  2x10                              Ther Ex             LTR HEP 10x10\" 10x10\" 10x10\" 10x10\" 10x10\" 10x10\" 10x10\" 10x10\"    Standing lumbar extension HEP 10x10\" 10x10\" 10x10\" 10x10\" 10x10\" 10x10\" 10x10\" 10x10\"    Seated thoracic extension HEP            Seated thoracic rotation HEP            Seated HS stretch  10x10\"           Standing nerve glide on step  10x10\" 10x10\" 10x10\" 10x10\" 10x10\" 10x10\" 10x10\" 10x10\"    ITB stretch  10x10\" 10x10\" 10x10\" 10x10\" 10x10\" 10x10\" 10x10\" "     DB deadlift  10# DB    2x10 15# DB  2x10 15#  DB  2x10 20# DB  2x10 20# DB  2x10 20# DB  2x10 20# DB  2x10 20# DB  2x10    MH ABD   25#  2x10 ea 25#  2x10 ea 25#  2x10 ea 25#  2x10ea 25#  2x10 ea 25#  2x10 ea 25#  2x10 ea    Leg press   75#  3x10 75#  3x10 85#   3x10 85#  3x10 85#  3x10 85#  3x10 95#  3x10    Ther Activity                                       Gait Training                                       Modalities                          IE/HEP JF

## 2024-03-26 ENCOUNTER — OFFICE VISIT (OUTPATIENT)
Dept: PHYSICAL THERAPY | Facility: CLINIC | Age: 34
End: 2024-03-26

## 2024-03-26 DIAGNOSIS — G89.29 CHRONIC PAIN OF RIGHT KNEE: ICD-10-CM

## 2024-03-26 DIAGNOSIS — G89.29 CHRONIC RIGHT-SIDED LOW BACK PAIN WITHOUT SCIATICA: Primary | ICD-10-CM

## 2024-03-26 DIAGNOSIS — M54.50 CHRONIC RIGHT-SIDED LOW BACK PAIN WITHOUT SCIATICA: Primary | ICD-10-CM

## 2024-03-26 DIAGNOSIS — M25.561 CHRONIC PAIN OF RIGHT KNEE: ICD-10-CM

## 2024-03-26 DIAGNOSIS — M25.551 RIGHT HIP PAIN: ICD-10-CM

## 2024-03-26 PROCEDURE — 97112 NEUROMUSCULAR REEDUCATION: CPT | Performed by: PHYSICAL THERAPIST

## 2024-03-26 PROCEDURE — 97110 THERAPEUTIC EXERCISES: CPT | Performed by: PHYSICAL THERAPIST

## 2024-03-26 NOTE — PROGRESS NOTES
"Daily Note     Today's date: 3/26/2024  Patient name: Neha Romano  : 1990  MRN: [id number]  Referring provider: Bobby Mcdowell, *  Dx:   Encounter Diagnosis     ICD-10-CM    1. Chronic right-sided low back pain without sciatica  M54.50     G89.29       2. Right hip pain  M25.551       3. Chronic pain of right knee  M25.561     G89.29                          Subjective: Patient reports that his back and glute pain are improving. Lateral knee pain remains the same.         Objective: See treatment diary below.       Assessment: Patient continues to be trending in the right direction. Functional glute strength improving. He remains appropriately challenged by current exercise program. Advised patient to increase frequency of back extension exercises. Patient will continue to benefit from skilled PT in order to address impairments and improve function.       Plan: Continue per POC     Precautions: n/a      Manuals 1/26 2/9 2/23 3/5 3/7 3/11 3/14 3/19 3/21 3/26   R hip PROM  JF JF JF DEMARIO HANKS JF                                          Neuro Re-Ed             Sciatic sequence HEP            Prone press HEP 10x10\" 10x10\" 10x10\" 10x10\" 10x10\" 10x10\" 10x10\" 10x10\" 10x10\"   clamshells  BTB  2x10 Purple  2x10 Purple  2x10 Purple  2x10 Purple  2x10 Purple  2x10 Purple  2x10 Purple  2x10 Purple  2x10   S/L hip extension  GTB  3x10   Leaning on table    2x10 Leaning on table    2x10 Leaning on table   2x10 Leaning on table   2x10 Leaning on table  2x10 Leaning on table  2x10   Bridges  BTB  2x10 Purple  2x10 Purple  2x10 Purple  2x10 Purple  2x10 Purple  2x10 Purple  2x10 Purple  2x10 Purple  2x10                             Ther Ex             LTR HEP 10x10\" 10x10\" 10x10\" 10x10\" 10x10\" 10x10\" 10x10\" 10x10\" 10x10\"   Standing lumbar extension HEP 10x10\" 10x10\" 10x10\" 10x10\" 10x10\" 10x10\" 10x10\" 10x10\" 10x10\"   Seated thoracic extension HEP            Seated thoracic rotation HEP            Seated HS " "stretch  10x10\"           Standing nerve glide on step  10x10\" 10x10\" 10x10\" 10x10\" 10x10\" 10x10\" 10x10\" 10x10\" 10x10\"   ITB stretch  10x10\" 10x10\" 10x10\" 10x10\" 10x10\" 10x10\" 10x10\"     DB deadlift  10# DB    2x10 15# DB  2x10 15#  DB  2x10 20# DB  2x10 20# DB  2x10 20# DB  2x10 20# DB  2x10 20# DB  2x10 20# dB  2x10   MH ABD   25#  2x10 ea 25#  2x10 ea 25#  2x10 ea 25#  2x10ea 25#  2x10 ea 25#  2x10 ea 25#  2x10 ea 25#  2x10 ea   Leg press   75#  3x10 75#  3x10 85#   3x10 85#  3x10 85#  3x10 85#  3x10 95#  3x10 95#  3x10   Ther Activity                                       Gait Training                                       Modalities                          IE/HEP JF                 "

## 2024-03-28 ENCOUNTER — OFFICE VISIT (OUTPATIENT)
Dept: PHYSICAL THERAPY | Facility: CLINIC | Age: 34
End: 2024-03-28

## 2024-03-28 DIAGNOSIS — M54.50 CHRONIC RIGHT-SIDED LOW BACK PAIN WITHOUT SCIATICA: Primary | ICD-10-CM

## 2024-03-28 DIAGNOSIS — G89.29 CHRONIC PAIN OF RIGHT KNEE: ICD-10-CM

## 2024-03-28 DIAGNOSIS — G89.29 CHRONIC RIGHT-SIDED LOW BACK PAIN WITHOUT SCIATICA: Primary | ICD-10-CM

## 2024-03-28 DIAGNOSIS — M25.551 RIGHT HIP PAIN: ICD-10-CM

## 2024-03-28 DIAGNOSIS — M25.561 CHRONIC PAIN OF RIGHT KNEE: ICD-10-CM

## 2024-03-28 PROCEDURE — 97110 THERAPEUTIC EXERCISES: CPT | Performed by: PHYSICAL THERAPIST

## 2024-03-28 PROCEDURE — 97112 NEUROMUSCULAR REEDUCATION: CPT | Performed by: PHYSICAL THERAPIST

## 2024-03-28 NOTE — PROGRESS NOTES
"Daily Note     Today's date: 3/28/2024  Patient name: Neha Romano  : 1990  MRN: [id number]  Referring provider: Bobby Mcdowell, *  Dx:   Encounter Diagnosis     ICD-10-CM    1. Chronic right-sided low back pain without sciatica  M54.50     G89.29       2. Right hip pain  M25.551       3. Chronic pain of right knee  M25.561     G89.29                          Subjective: Patient reports that he continues to have knee and glute pain, but thinks this may be coming from the long drive after work. He notes that the pain is improved by the standing lumbar extensions.         Objective: See treatment diary below.       Assessment: Patient continues to be trending in the right direction. Functional glute strength improving. He remains appropriately challenged by current exercise program. Advised patient to increase frequency of back extension exercises and increased the intensity with use of a belt at home. Patient will continue to benefit from skilled PT in order to address impairments and improve function.       Plan: Continue per POC     Precautions: n/a      Manuals 3/11 3/14 3/19 3/21 3/26 3/28    R hip PROM JF JF JF JF JF JF                                  Neuro Re-Ed          Sciatic sequence          Prone press 10x10\" 10x10\" 10x10\" 10x10\" 10x10\" 10x10\"    clamshells Purple  2x10 Purple  2x10 Purple  2x10 Purple  2x10 Purple  2x10 Purple  2x10    S/L hip extension Leaning on table    2x10 Leaning on table   2x10 Leaning on table   2x10 Leaning on table  2x10 Leaning on table  2x10 Leaning on table 2x10    Bridges Purple  2x10 Purple  2x10 Purple  2x10 Purple  2x10 Purple  2x10 Purple  2x10                        Ther Ex          LTR 10x10\" 10x10\" 10x10\" 10x10\" 10x10\" 10x10\"    Standing lumbar extension 10x10\" 10x10\" 10x10\" 10x10\" 10x10\" 10x10\"  With belt                                  Standing nerve glide on step 10x10\" 10x10\" 10x10\" 10x10\" 10x10\" 10x10\"    ITB stretch 10x10\" 10x10\" 10x10\"     "   DB deadlift 20# DB  2x10 20# DB  2x10 20# DB  2x10 20# DB  2x10 20# dB  2x10 25#  DB  2x10    MH ABD 25#  2x10ea 25#  2x10 ea 25#  2x10 ea 25#  2x10 ea 25#  2x10 ea 25#  2x10 ea    Leg press 85#  3x10 85#  3x10 85#  3x10 95#  3x10 95#  3x10 95#  3x10    Ther Activity                              Gait Training                              Modalities                    IE/HEP

## 2024-04-02 ENCOUNTER — OFFICE VISIT (OUTPATIENT)
Dept: PHYSICAL THERAPY | Facility: CLINIC | Age: 34
End: 2024-04-02

## 2024-04-02 DIAGNOSIS — M25.551 RIGHT HIP PAIN: ICD-10-CM

## 2024-04-02 DIAGNOSIS — G89.29 CHRONIC RIGHT-SIDED LOW BACK PAIN WITHOUT SCIATICA: Primary | ICD-10-CM

## 2024-04-02 DIAGNOSIS — G89.29 CHRONIC PAIN OF RIGHT KNEE: ICD-10-CM

## 2024-04-02 DIAGNOSIS — M54.50 CHRONIC RIGHT-SIDED LOW BACK PAIN WITHOUT SCIATICA: Primary | ICD-10-CM

## 2024-04-02 DIAGNOSIS — M25.561 CHRONIC PAIN OF RIGHT KNEE: ICD-10-CM

## 2024-04-02 PROCEDURE — 97110 THERAPEUTIC EXERCISES: CPT | Performed by: PHYSICAL THERAPIST

## 2024-04-02 PROCEDURE — 97112 NEUROMUSCULAR REEDUCATION: CPT | Performed by: PHYSICAL THERAPIST

## 2024-04-02 NOTE — PROGRESS NOTES
"Daily Note     Today's date: 2024  Patient name: Neha Romano  : 1990  MRN: [id number]  Referring provider: Bobby Mcdowell, *  Dx:   Encounter Diagnosis     ICD-10-CM    1. Chronic right-sided low back pain without sciatica  M54.50     G89.29       2. Right hip pain  M25.551       3. Chronic pain of right knee  M25.561     G89.29                          Subjective: Patient reports that his glute and back are feeling better, but his knee is feeling just as bad or worse. He points to the lateral aspect of the R knee.       Objective: See treatment diary below.       Assessment: Added additional knee strengthening exercises this session. Patient was able to tolerate progression of resistance exercises without an increase in pain. They demonstrated muscular fatigue as expected with progression. Trending in the right direction despite increased knee pain.  Patient will continue to benefit from skilled PT in order to address impairments and improve function.       Plan: Continue per POC     Precautions: n/a      Manuals 3/11 3/14 3/19 3/21 3/26 3/28 4/2   R hip PROM JF JF DEMARIO HANKS JF                                 Neuro Re-Ed          Sciatic sequence          Prone press 10x10\" 10x10\" 10x10\" 10x10\" 10x10\" 10x10\" 10x10\"   clamshells Purple  2x10 Purple  2x10 Purple  2x10 Purple  2x10 Purple  2x10 Purple  2x10 Purple  2x10   S/L hip extension Leaning on table    2x10 Leaning on table   2x10 Leaning on table   2x10 Leaning on table  2x10 Leaning on table  2x10 Leaning on table 2x10 Leaning on table  2x10   Bridges Purple  2x10 Purple  2x10 Purple  2x10 Purple  2x10 Purple  2x10 Purple  2x10 Purple  2x10                       Ther Ex          LTR 10x10\" 10x10\" 10x10\" 10x10\" 10x10\" 10x10\" 10x10\"   Standing lumbar extension 10x10\" 10x10\" 10x10\" 10x10\" 10x10\" 10x10\"  With belt 10x10\" With belt                                 Standing nerve glide on step 10x10\" 10x10\" 10x10\" 10x10\" 10x10\" 10x10\" " "10x10\"   ITB stretch 10x10\" 10x10\" 10x10\"       DB deadlift 20# DB  2x10 20# DB  2x10 20# DB  2x10 20# DB  2x10 20# dB  2x10 25#  DB  2x10 25#  DB  2x10   MH ABD 25#  2x10ea 25#  2x10 ea 25#  2x10 ea 25#  2x10 ea 25#  2x10 ea 25#  2x10 ea 25#  2x10 ea   Leg press 85#  3x10 85#  3x10 85#  3x10 95#  3x10 95#  3x10 95#  3x10 105#  3x10   Ther Activity                              Gait Training                              Modalities                    IE/HEP               "

## 2024-04-04 ENCOUNTER — OFFICE VISIT (OUTPATIENT)
Dept: PHYSICAL THERAPY | Facility: CLINIC | Age: 34
End: 2024-04-04

## 2024-04-04 DIAGNOSIS — M25.551 RIGHT HIP PAIN: ICD-10-CM

## 2024-04-04 DIAGNOSIS — M25.561 CHRONIC PAIN OF RIGHT KNEE: ICD-10-CM

## 2024-04-04 DIAGNOSIS — G89.29 CHRONIC RIGHT-SIDED LOW BACK PAIN WITHOUT SCIATICA: Primary | ICD-10-CM

## 2024-04-04 DIAGNOSIS — M54.50 CHRONIC RIGHT-SIDED LOW BACK PAIN WITHOUT SCIATICA: Primary | ICD-10-CM

## 2024-04-04 DIAGNOSIS — G89.29 CHRONIC PAIN OF RIGHT KNEE: ICD-10-CM

## 2024-04-04 PROCEDURE — 97110 THERAPEUTIC EXERCISES: CPT | Performed by: PHYSICAL THERAPIST

## 2024-04-04 PROCEDURE — 97112 NEUROMUSCULAR REEDUCATION: CPT | Performed by: PHYSICAL THERAPIST

## 2024-04-04 PROCEDURE — 97530 THERAPEUTIC ACTIVITIES: CPT | Performed by: PHYSICAL THERAPIST

## 2024-04-04 NOTE — PROGRESS NOTES
PT Re-evaluation     Today's date: 2024  Patient name: Neha Romano  : 1990  MRN: [id number]  Referring provider: Tom Israel, JEREMIAH  Dx:   Encounter Diagnosis     ICD-10-CM    1. Chronic right-sided low back pain without sciatica  M54.50     G89.29       2. Right hip pain  M25.551       3. Chronic pain of right knee  M25.561     G89.29           Assessment/Plan  Patient is a 33 y.o. male presenting to OP PT with R hip and lateral leg pain. They have attended 12 visits over 8 weeks.  Since starting therapy pt has made the following progress towards goals:  1) Pain: Patient reports that their max pain has decreased to 6/10 from 9/10  2) Range of motion: Patient's lumbar ROM improved by 25%  3.)Strength: Patient's LE strength has improved by at least 1/2 grade     Patient is progressing well. They continue to have deficits with glute strength, knee pain,  and decreased standing tolerance. Therapy has consisted of strengthening and nerve mobility. Their program will progress to include functional glute strengthening. Continue to progress as tolerated. Patient will continue to benefit from skilled PT in order to address impairments and improve function.          See updated goals below.      Goals  STG (3 weeks)  Pain: Patient will subjectively report maximum pain decreased by 2/10- MET  ROM: Patient will increase lumbar ROM by 25%- MET  Function: Patient increase score on FOTO by 10 points- MET    LTG (6 weeks)  Pain: Patient will subjectively report less than 2/10 pain with functional activities.- MET  ROM: Patient will increase lumbar ROM by at least 50%- progressing  Function: Patient will increase score on FOTO to predicted value or better- progressing  Patient will be ind with HEP by DC- progressing    Plan  Plan details: Prognosis above is given PT services 1x/week over the next 4 weeks and home program adherence.   Patient would benefit from: skilled physical therapy, home exercise  program  Referral necessary: no  Planned modality interventions: Hydrocollator packs, Cryotherapy, TENS, Low level laser, Traction, and Cupping  Planned therapy interventions: joint mobilization, manual therapy, massage, neuromuscular re-education, patient education, stretching, strengthening, therapeutic activities, therapeutic exercise, home exercise program, functional ROM exercises, gait training, flexibility, balance, abdominal trunk stabilization, motor coordination training, coordination and behavior modification  Frequency: 2x/week for 4 weeks  Plan of Care beginning date: 4/4/2024:  Plan of Care expiration date: 5/2/2024  Treatment plan discussed with: patient    Subjective  UPDATE 4/4/2024: Patient reports that his knee is feeling better, though he continues to have some pain in the lateral knee. Back getting better and glute feeling stronger. Patient feels that his leg is 50% better overall.     UPDATE 3/5/2024: Patient reports that his leg pain continues to get better, but he does note that he gets occasionally lateral knee pain and R gluteal pain. He states that he has been consistent with HEP for the last week and has noticed an improvement of symptoms with the extension based exercises.     IE (1/26/2024): Patient is a 33 y.o. male who presents for an initial outpatient physical therapy consultation regarding their R knee pain. Patient previously treated by this therapist over the summer. He states that he stopped doing  his exercises after a few weeks. The pain started to come back a little bit, in his low back, lateral hip, and lateral knee. He began to do his HEP again, but then was set back from needing to snow shovel. Feels that shoveling flared up his back. Feels tension in his when laying in bed, on back or on R side. Pain is worse with being on his feet all day at work.     Aggravating factors: Standing for more than a few hours. By the end of the day. Driving.   Alleviating factors:  "exercises, OTC meds, rest  Functional limitations: ADLs, work, recreations    Pain  Best: 2/10 (was 3/10)  Worst: 6/10 (was 9/10)  Irritability: minutes to hours  Location: back, R hip, R knee  Quality: sharp and burning  Progression:  improving.     Social Support  Employment status: Pharmacist, on feet all day.       Patient Goals for Therapy  \"Decrease the pain. \"    Objective     Concurrent Complaints  Negative for night pain, disturbed sleep, bladder dysfunction, bowel dysfunction, saddle (S4) numbness, cardiac problem, kidney problem, gallbladder problem, stomach problem, ulcer, appendix problem, spleen problem, pancreas problem, history of cancer, history of trauma and infection    Postural Observations  Seated posture: fair  Standing posture: fair      Palpation     Additional Palpation Details  TTP lumbar paraspinals    Neurological Testing     Sensation     Lumbar   Left   Intact: light touch    Right   Intact: light touch    Active Range of Motion   Cervical/Thoracic Spine       Thoracic    Flexion:  Restriction level: WNL (was minimal)  Extension:  Restriction level: moderate    Lumbar   Flexion:  Restriction level: minimal  Extension:  Restriction level: moderate  Left rotation:  Restriction level: minimal  Right rotation:  Restriction level: minimal      Mechanical Assessment    Cervical      Thoracic      Lumbar    Standing flexion: repeated movements   Pain location:no change  Pain intensity: worse  Pain level: increased  Standing extension: repeated movements  Pain location: centralized  Pain intensity: better  Pain level: decreased    Strength/Myotome Testing     Additional Strength Details  Myotomal screen unremarkable    Tests     Lumbar     Left   Negative crossed SLR and passive SLR.     Right   Negative crossed SLR, passive SLR and slump test.     Left Hip   Negative FADIR.     Right Hip   Positive FADIR.              Precautions: n/a      Manuals 3/11 3/14 3/19 3/21 3/26 3/28 4/2 4/4   R hip " "PROM JF JF JF JF JF JF JF JF                                                            Neuro Re-Ed                  Sciatic sequence                  Prone press 10x10\" 10x10\" 10x10\" 10x10\" 10x10\" 10x10\" 10x10\" 10x10'   clamshells Purple  2x10 Purple  2x10 Purple  2x10 Purple  2x10 Purple  2x10 Purple  2x10 Purple  2x10 Purple  2x10   S/L hip extension Leaning on table     2x10 Leaning on table   2x10 Leaning on table   2x10 Leaning on table  2x10 Leaning on table  2x10 Leaning on table 2x10 Leaning on table  2x10 Leaning on table  2x10   Bridges Purple  2x10 Purple  2x10 Purple  2x10 Purple  2x10 Purple  2x10 Purple  2x10 Purple  2x10 Purple  2x10                                         Ther Ex                  LTR 10x10\" 10x10\" 10x10\" 10x10\" 10x10\" 10x10\" 10x10\" 10x10\"   Standing lumbar extension 10x10\" 10x10\" 10x10\" 10x10\" 10x10\" 10x10\"  With belt 10x10\" With belt 10x10\"  With belt                                                            Standing nerve glide on step 10x10\" 10x10\" 10x10\" 10x10\" 10x10\" 10x10\" 10x10\" 10x10\"   ITB stretch 10x10\" 10x10\" 10x10\"            DB deadlift 20# DB  2x10 20# DB  2x10 20# DB  2x10 20# DB  2x10 20# dB  2x10 25#  DB  2x10 25#  DB  2x10 25#  DB  2x10   MH ABD 25#  2x10ea 25#  2x10 ea 25#  2x10 ea 25#  2x10 ea 25#  2x10 ea 25#  2x10 ea 25#  2x10 ea 25#  2x10 ea   Leg press 85#  3x10 85#  3x10 85#  3x10 95#  3x10 95#  3x10 95#  3x10 105#  3x10 105#  3x10   Ther Activity                                                        Gait Training                                                        Modalities                                     IE/HEP                        "

## 2024-04-11 ENCOUNTER — APPOINTMENT (OUTPATIENT)
Dept: PHYSICAL THERAPY | Facility: CLINIC | Age: 34
End: 2024-04-11

## 2024-04-11 ENCOUNTER — OFFICE VISIT (OUTPATIENT)
Dept: PHYSICAL THERAPY | Facility: CLINIC | Age: 34
End: 2024-04-11

## 2024-04-11 DIAGNOSIS — G89.29 CHRONIC PAIN OF RIGHT KNEE: ICD-10-CM

## 2024-04-11 DIAGNOSIS — M25.551 RIGHT HIP PAIN: ICD-10-CM

## 2024-04-11 DIAGNOSIS — G89.29 CHRONIC RIGHT-SIDED LOW BACK PAIN WITHOUT SCIATICA: Primary | ICD-10-CM

## 2024-04-11 DIAGNOSIS — M25.561 CHRONIC PAIN OF RIGHT KNEE: ICD-10-CM

## 2024-04-11 DIAGNOSIS — M54.50 CHRONIC RIGHT-SIDED LOW BACK PAIN WITHOUT SCIATICA: Primary | ICD-10-CM

## 2024-04-11 PROCEDURE — 97112 NEUROMUSCULAR REEDUCATION: CPT | Performed by: PHYSICAL THERAPIST

## 2024-04-11 PROCEDURE — 97110 THERAPEUTIC EXERCISES: CPT | Performed by: PHYSICAL THERAPIST

## 2024-04-11 NOTE — PROGRESS NOTES
"Daily Note     Today's date: 2024  Patient name: Neha Romano  : 1990  MRN: [id number]  Referring provider: Bobby Mcdowell, *  Dx:   Encounter Diagnosis     ICD-10-CM    1. Chronic right-sided low back pain without sciatica  M54.50     G89.29       2. Right hip pain  M25.551       3. Chronic pain of right knee  M25.561     G89.29                      Subjective: Patient reports that his glute and back are doing better, but his lateral knee remains painful.       Objective: See treatment diary below      Assessment: Able to tolerate all exercises are prescribed. Patient reported improvement of symptom post IASTM and LASER to the lateral knee.  Continue to progress as tolerated. Patient will continue to benefit from skilled PT in order to address impairments and improve function.       Plan: Continue per plan of care.  Progress treatment as tolerated.       Precautions: n/a       Manuals 3/11 3/14 3/19 3/21 3/26 3/28 4/2 4/4 4/11   R hip PROM JF JF JF JF JF JF JF JF JF    IASTM                 Lateral knee  JF                                             Neuro Re-Ed                    Sciatic sequence                    Prone press 10x10\" 10x10\" 10x10\" 10x10\" 10x10\" 10x10\" 10x10\" 10x10' 10x10\"   clamshells Purple  2x10 Purple  2x10 Purple  2x10 Purple  2x10 Purple  2x10 Purple  2x10 Purple  2x10 Purple  2x10 Purple  2x10   S/L hip extension Leaning on table     2x10 Leaning on table   2x10 Leaning on table   2x10 Leaning on table  2x10 Leaning on table  2x10 Leaning on table 2x10 Leaning on table  2x10 Leaning on table  2x10 Leaning on table  2x10   Bridges Purple  2x10 Purple  2x10 Purple  2x10 Purple  2x10 Purple  2x10 Purple  2x10 Purple  2x10 Purple  2x10 Purple  2x10                                             Ther Ex                    LTR 10x10\" 10x10\" 10x10\" 10x10\" 10x10\" 10x10\" 10x10\" 10x10\" 10x10\"   Standing lumbar extension 10x10\" 10x10\" 10x10\" 10x10\" 10x10\" 10x10\"  With belt 10x10\" " "With belt 10x10\"  With belt 10x10\" with belt                                                                  Standing nerve glide on step 10x10\" 10x10\" 10x10\" 10x10\" 10x10\" 10x10\" 10x10\" 10x10\" 10x10\"   ITB stretch 10x10\" 10x10\" 10x10\"              DB deadlift 20# DB  2x10 20# DB  2x10 20# DB  2x10 20# DB  2x10 20# dB  2x10 25#  DB  2x10 25#  DB  2x10 25#  DB  2x10 25#  DB  2x10   MH ABD 25#  2x10ea 25#  2x10 ea 25#  2x10 ea 25#  2x10 ea 25#  2x10 ea 25#  2x10 ea 25#  2x10 ea 25#  2x10 ea 25#  2x10 ea   Leg press 85#  3x10 85#  3x10 85#  3x10 95#  3x10 95#  3x10 95#  3x10 105#  3x10 105#  3x10 105#  3x10   Ther Activity                                                              Gait Training                                                              Modalities                     LASER                 Sprain/strain  15w    JF   IE/HEP                              "

## 2024-04-18 ENCOUNTER — OFFICE VISIT (OUTPATIENT)
Dept: PHYSICAL THERAPY | Facility: CLINIC | Age: 34
End: 2024-04-18

## 2024-04-18 DIAGNOSIS — M25.551 RIGHT HIP PAIN: ICD-10-CM

## 2024-04-18 DIAGNOSIS — G89.29 CHRONIC RIGHT-SIDED LOW BACK PAIN WITHOUT SCIATICA: Primary | ICD-10-CM

## 2024-04-18 DIAGNOSIS — M25.561 CHRONIC PAIN OF RIGHT KNEE: ICD-10-CM

## 2024-04-18 DIAGNOSIS — M54.50 CHRONIC RIGHT-SIDED LOW BACK PAIN WITHOUT SCIATICA: Primary | ICD-10-CM

## 2024-04-18 DIAGNOSIS — G89.29 CHRONIC PAIN OF RIGHT KNEE: ICD-10-CM

## 2024-04-18 PROCEDURE — 97110 THERAPEUTIC EXERCISES: CPT | Performed by: PHYSICAL THERAPIST

## 2024-04-18 PROCEDURE — 97112 NEUROMUSCULAR REEDUCATION: CPT | Performed by: PHYSICAL THERAPIST

## 2024-04-18 NOTE — PROGRESS NOTES
"Daily Note     Today's date: 2024  Patient name: Neha Romano  : 1990  MRN: [id number]  Referring provider: Bobby Mcdowell, *  Dx:   Encounter Diagnosis     ICD-10-CM    1. Chronic right-sided low back pain without sciatica  M54.50     G89.29       2. Right hip pain  M25.551       3. Chronic pain of right knee  M25.561     G89.29                      Subjective: Patient reports that his lateral and anterior are feeling moderately better this week. Does note some posterior knee pain that has popped up this week.       Objective: See treatment diary below      Assessment: Able to tolerate all exercises are prescribed. Held progressions this session. Patient again reported improvement of symptom post IASTM and LASER to the lateral knee.  Continue to progress as tolerated. Patient will continue to benefit from skilled PT in order to address impairments and improve function.       Plan: Continue per plan of care.  Progress treatment as tolerated.       Precautions: n/a       Manuals 3/11 3/14 3/19 3/21 3/26 3/28 4/2 4/4 4/11 4/18   R hip PROM JF JF JF JF JF JF JF JF JF JF    IASTM                 Lateral knee  JF Lateral knee  JF                                               Neuro Re-Ed                     Sciatic sequence                  Supine HS stretch    10x10\"   Prone press 10x10\" 10x10\" 10x10\" 10x10\" 10x10\" 10x10\" 10x10\" 10x10' 10x10\" 10x10\"   clamshells Purple  2x10 Purple  2x10 Purple  2x10 Purple  2x10 Purple  2x10 Purple  2x10 Purple  2x10 Purple  2x10 Purple  2x10 Purple  2x10   S/L hip extension Leaning on table     2x10 Leaning on table   2x10 Leaning on table   2x10 Leaning on table  2x10 Leaning on table  2x10 Leaning on table 2x10 Leaning on table  2x10 Leaning on table  2x10 Leaning on table  2x10 Leaning on table   2x10   Bridges Purple  2x10 Purple  2x10 Purple  2x10 Purple  2x10 Purple  2x10 Purple  2x10 Purple  2x10 Purple  2x10 Purple  2x10 Purple  2x10                      " "                         Ther Ex                     LTR 10x10\" 10x10\" 10x10\" 10x10\" 10x10\" 10x10\" 10x10\" 10x10\" 10x10\" 10x10\"   Standing lumbar extension 10x10\" 10x10\" 10x10\" 10x10\" 10x10\" 10x10\"  With belt 10x10\" With belt 10x10\"  With belt 10x10\" with belt 10x10\" with belt                                                                     Standing nerve glide on step 10x10\" 10x10\" 10x10\" 10x10\" 10x10\" 10x10\" 10x10\" 10x10\" 10x10\" 10x10\"   ITB stretch 10x10\" 10x10\" 10x10\"               DB deadlift 20# DB  2x10 20# DB  2x10 20# DB  2x10 20# DB  2x10 20# dB  2x10 25#  DB  2x10 25#  DB  2x10 25#  DB  2x10 25#  DB  2x10 25#  DB  2x10   MH ABD 25#  2x10ea 25#  2x10 ea 25#  2x10 ea 25#  2x10 ea 25#  2x10 ea 25#  2x10 ea 25#  2x10 ea 25#  2x10 ea 25#  2x10 ea 25#  2x10 ea   Leg press 85#  3x10 85#  3x10 85#  3x10 95#  3x10 95#  3x10 95#  3x10 105#  3x10 105#  3x10 105#  3x10 105#  3x10   Ther Activity                                                                 Gait Training                                                                 Modalities                      LASER                 Sprain/strain  15w    JF    IE/HEP                               "

## 2024-04-25 ENCOUNTER — OFFICE VISIT (OUTPATIENT)
Dept: PHYSICAL THERAPY | Facility: CLINIC | Age: 34
End: 2024-04-25

## 2024-04-25 DIAGNOSIS — G89.29 CHRONIC PAIN OF RIGHT KNEE: ICD-10-CM

## 2024-04-25 DIAGNOSIS — M54.50 CHRONIC RIGHT-SIDED LOW BACK PAIN WITHOUT SCIATICA: Primary | ICD-10-CM

## 2024-04-25 DIAGNOSIS — M25.551 RIGHT HIP PAIN: ICD-10-CM

## 2024-04-25 DIAGNOSIS — G89.29 CHRONIC RIGHT-SIDED LOW BACK PAIN WITHOUT SCIATICA: Primary | ICD-10-CM

## 2024-04-25 DIAGNOSIS — M25.561 CHRONIC PAIN OF RIGHT KNEE: ICD-10-CM

## 2024-04-25 PROCEDURE — 97530 THERAPEUTIC ACTIVITIES: CPT | Performed by: PHYSICAL THERAPIST

## 2024-04-25 PROCEDURE — 97110 THERAPEUTIC EXERCISES: CPT | Performed by: PHYSICAL THERAPIST

## 2024-04-25 PROCEDURE — 97112 NEUROMUSCULAR REEDUCATION: CPT | Performed by: PHYSICAL THERAPIST

## 2024-04-25 NOTE — PROGRESS NOTES
"Daily Note     Today's date: 2024  Patient name: Neha Romano  : 1990  MRN: [id number]  Referring provider: Bobby Mcdowell, *  Dx:   Encounter Diagnosis     ICD-10-CM    1. Chronic right-sided low back pain without sciatica  M54.50     G89.29       2. Right hip pain  M25.551       3. Chronic pain of right knee  M25.561     G89.29                      Subjective: Patient reports that he is overall feeling better today from than last week. Still having some lateral hamstring and LBP. Notes that his low back stiff feels stiff.       Objective: See treatment diary below  (+) cavitation with S/L lumbar rotation HVLA      Assessment: Able to tolerate all exercises are prescribed. Patient again reported improvement of symptom post IASTM and LASER to the lateral knee. Added lumbar rotation HVLA and it was tolerated well.  Continue to progress as tolerated. Patient will continue to benefit from skilled PT in order to address impairments and improve function.       Plan: Continue per plan of care.  Progress treatment as tolerated.       Precautions: n/a       Manuals 3/26 3/28 4/2 4/4 4/11 4/18 4/25   R hip PROM JF JF JF JF JF JF JF    IASTM         Lateral knee  JF Lateral knee  JF Lateral knee JF    S/L L/S rotation HVLA           JF  Gr 5                  Neuro Re-Ed              Sciatic sequence          Supine HS stretch    10x10\" Supine   HS  Stretch    10x10\"   Prone press 10x10\" 10x10\" 10x10\" 10x10' 10x10\" 10x10\" 10x10\"   clamshells Purple  2x10 Purple  2x10 Purple  2x10 Purple  2x10 Purple  2x10 Purple  2x10 Purple  2x10   S/L hip extension Leaning on table  2x10 Leaning on table 2x10 Leaning on table  2x10 Leaning on table  2x10 Leaning on table  2x10 Leaning on table   2x10 Leaning   On table  2x10   Bridges Purple  2x10 Purple  2x10 Purple  2x10 Purple  2x10 Purple  2x10 Purple  2x10 Purple  2x10                                 Ther Ex              LTR 10x10\" 10x10\" 10x10\" 10x10\" 10x10\" " "10x10\" 10x10\"   Standing lumbar extension 10x10\" 10x10\"  With belt 10x10\" With belt 10x10\"  With belt 10x10\" with belt 10x10\" with belt 10x10\"  With belt                                                Standing nerve glide on step 10x10\" 10x10\" 10x10\" 10x10\" 10x10\" 10x10\" 10x10\"   ITB stretch              DB deadlift 20# dB  2x10 25#  DB  2x10 25#  DB  2x10 25#  DB  2x10 25#  DB  2x10 25#  DB  2x10 25#  DB  2x10   MH ABD 25#  2x10 ea 25#  2x10 ea 25#  2x10 ea 25#  2x10 ea 25#  2x10 ea 25#  2x10 ea 25#  2x10 ea   Leg press 95#  3x10 95#  3x10 105#  3x10 105#  3x10 105#  3x10 105#  3x10 105#  3x10   Ther Activity                                            Gait Training                                            Modalities               LASER         Sprain/strain  15w    JF Sprain/strain  15w    JF Sprain/strain  15w    JF   IE/HEP                        "

## 2024-05-02 ENCOUNTER — OFFICE VISIT (OUTPATIENT)
Dept: PHYSICAL THERAPY | Facility: CLINIC | Age: 34
End: 2024-05-02

## 2024-05-02 DIAGNOSIS — M25.551 RIGHT HIP PAIN: ICD-10-CM

## 2024-05-02 DIAGNOSIS — G89.29 CHRONIC RIGHT-SIDED LOW BACK PAIN WITHOUT SCIATICA: Primary | ICD-10-CM

## 2024-05-02 DIAGNOSIS — M25.561 CHRONIC PAIN OF RIGHT KNEE: ICD-10-CM

## 2024-05-02 DIAGNOSIS — M54.50 CHRONIC RIGHT-SIDED LOW BACK PAIN WITHOUT SCIATICA: Primary | ICD-10-CM

## 2024-05-02 DIAGNOSIS — G89.29 CHRONIC PAIN OF RIGHT KNEE: ICD-10-CM

## 2024-05-02 PROCEDURE — 97110 THERAPEUTIC EXERCISES: CPT | Performed by: PHYSICAL THERAPIST

## 2024-05-02 PROCEDURE — 97530 THERAPEUTIC ACTIVITIES: CPT | Performed by: PHYSICAL THERAPIST

## 2024-05-02 PROCEDURE — 97112 NEUROMUSCULAR REEDUCATION: CPT | Performed by: PHYSICAL THERAPIST

## 2024-05-02 NOTE — PROGRESS NOTES
PT Re-evaluation     Today's date: 2024  Patient name: Neha Romano  : 1990  MRN: [id number]  Referring provider: Tom Israel, JEREMIAH  Dx:   Encounter Diagnosis     ICD-10-CM    1. Chronic right-sided low back pain without sciatica  M54.50     G89.29       2. Right hip pain  M25.551       3. Chronic pain of right knee  M25.561     G89.29           Assessment/Plan  Patient is a 33 y.o. male presenting to OP PT with R hip and lateral leg pain. They have attended 16 visits over 12 weeks. Since starting therapy pt has made the following progress towards goals:  1) Pain: Patient reports that their max pain has decreased to 5/10 from 9/10  2) Range of motion: Patient's lumbar ROM improved by 50%  3.)Strength: Patient's LE strength has improved by at least 1/2 grade     Patient is progressing well. They continue to have deficits with glute strength, knee pain,  and decreased standing tolerance. Therapy has consisted of strengthening and nerve mobility. Their program will progress to include functional glute strengthening, nerve mobility, and modalities. Continue to progress as tolerated. Patient will continue to benefit from skilled PT in order to address impairments and improve function.          See updated goals below.      Goals  STG (3 weeks)  Pain: Patient will subjectively report maximum pain decreased by 2/10- MET  ROM: Patient will increase lumbar ROM by 25%- MET  Function: Patient increase score on FOTO by 10 points- MET    LTG (6 weeks)  Pain: Patient will subjectively report less than 2/10 pain with functional activities.- MET  ROM: Patient will increase lumbar ROM by at least 50%- MET  Function: Patient will increase score on FOTO to predicted value or better- progressing  Patient will be ind with HEP by DC- progressing    Plan  Plan details: Prognosis above is given PT services 1x every other week over the next 6 weeks and home program adherence.   Patient would benefit from: skilled  physical therapy, home exercise program  Referral necessary: no  Planned modality interventions: Hydrocollator packs, Cryotherapy, TENS, Low level laser, Traction, and Cupping  Planned therapy interventions: joint mobilization, manual therapy, massage, neuromuscular re-education, patient education, stretching, strengthening, therapeutic activities, therapeutic exercise, home exercise program, functional ROM exercises, gait training, flexibility, balance, abdominal trunk stabilization, motor coordination training, coordination and behavior modification  Frequency: 1x/week for 6 weeks  Plan of Care beginning date: 5/2/2024  Plan of Care expiration date: 6/13/2024  Treatment plan discussed with: patient    Subjective  UPDATE 5/2/2024: Patient reports that his symptoms continue to get better. He feels that he is able to stand longer, but continues to have more pain with standing for more than 2-3 hours at a time, such as with work. Glute and back pain getting better. Lateral and posterior knee still bothersome at times. Overall, patient feels that they are 70% better.     UPDATE 4/4/2024: Patient reports that his knee is feeling better, though he continues to have some pain in the lateral knee. Back getting better and glute feeling stronger. Patient feels that his leg is 50% better overall.     UPDATE 3/5/2024: Patient reports that his leg pain continues to get better, but he does note that he gets occasionally lateral knee pain and R gluteal pain. He states that he has been consistent with HEP for the last week and has noticed an improvement of symptoms with the extension based exercises.     IE (1/26/2024): Patient is a 33 y.o. male who presents for an initial outpatient physical therapy consultation regarding their R knee pain. Patient previously treated by this therapist over the summer. He states that he stopped doing  his exercises after a few weeks. The pain started to come back a little bit, in his low back,  "lateral hip, and lateral knee. He began to do his HEP again, but then was set back from needing to snow shovel. Feels that shoveling flared up his back. Feels tension in his when laying in bed, on back or on R side. Pain is worse with being on his feet all day at work.     Aggravating factors: Standing for more than a few hours. By the end of the day. Driving.   Alleviating factors: exercises, OTC meds, rest  Functional limitations: ADLs, work, recreations    Pain  Best: 2/10 (was 3/10)  Worst: 5/10 (was 9/10)  Irritability: minutes to hours  Location: back, R hip, R knee  Quality: sharp and burning  Progression:  improving.     Social Support  Employment status: Pharmacist, on feet all day.       Patient Goals for Therapy  \"Decrease the pain. \"    Objective     Concurrent Complaints  Negative for night pain, disturbed sleep, bladder dysfunction, bowel dysfunction, saddle (S4) numbness, cardiac problem, kidney problem, gallbladder problem, stomach problem, ulcer, appendix problem, spleen problem, pancreas problem, history of cancer, history of trauma and infection    Postural Observations  Seated posture: fair  Standing posture: fair      Palpation     Additional Palpation Details  TTP lumbar paraspinals    Neurological Testing     Sensation     Lumbar   Left   Intact: light touch    Right   Intact: light touch    Active Range of Motion   Cervical/Thoracic Spine       Thoracic    Flexion:  Restriction level: WNL (was minimal)  Extension:  Restriction level: moderate    Lumbar   Flexion:  Restriction level: minimal  Extension:  Restriction level: moderate  Left rotation:  Restriction level: minimal  Right rotation:  Restriction level: minimal      Mechanical Assessment    Cervical      Thoracic      Lumbar    Standing flexion: repeated movements   Pain location:no change  Pain intensity: worse  Pain level: increased  Standing extension: repeated movements  Pain location: centralized  Pain intensity: better  Pain " "level: decreased    Strength/Myotome Testing     Additional Strength Details  Myotomal screen unremarkable    Tests     Lumbar     Left   Negative crossed SLR and passive SLR.     Right   Negative crossed SLR, passive SLR and slump test.     Left Hip   Negative FADIR.     Right Hip   Positive FADIR.        Precautions: n/a          Manuals 3/26 3/28 4/2 4/4 4/11 4/18 4/25 5/2   R hip PROM JF JF JF JF JF JF JF JF    IASTM         Lateral knee  JF Lateral knee  JF Lateral knee JF Lateral knee  JF    S/L L/S rotation HVLA             JF  Gr 5                       Neuro Re-Ed                  Sciatic sequence           Supine HS stretch     10x10\" Supine   HS  Stretch     10x10\" Supine HS stretch    10x10\"   Prone press 10x10\" 10x10\" 10x10\" 10x10' 10x10\" 10x10\" 10x10\" held   clamshells Purple  2x10 Purple  2x10 Purple  2x10 Purple  2x10 Purple  2x10 Purple  2x10 Purple  2x10 Purple  2x10   S/L hip extension Leaning on table  2x10 Leaning on table 2x10 Leaning on table  2x10 Leaning on table  2x10 Leaning on table  2x10 Leaning on table   2x10 Leaning   On table  2x10 Leaning on table  2x10   Bridges Purple  2x10 Purple  2x10 Purple  2x10 Purple  2x10 Purple  2x10 Purple  2x10 Purple  2x10 Purple  2x10                                         Ther Ex                  LTR 10x10\" 10x10\" 10x10\" 10x10\" 10x10\" 10x10\" 10x10\"    Standing lumbar extension 10x10\" 10x10\"  With belt 10x10\" With belt 10x10\"  With belt 10x10\" with belt 10x10\" with belt 10x10\"  With belt                                                             Standing nerve glide on step 10x10\" 10x10\" 10x10\" 10x10\" 10x10\" 10x10\" 10x10\" 10x10\"   ITB stretch                  DB deadlift 20# dB  2x10 25#  DB  2x10 25#  DB  2x10 25#  DB  2x10 25#  DB  2x10 25#  DB  2x10 25#  DB  2x10 25#  DB  2x10   MH ABD 25#  2x10 ea 25#  2x10 ea 25#  2x10 ea 25#  2x10 ea 25#  2x10 ea 25#  2x10 ea 25#  2x10 ea 25#  2x10  ea   Leg press 95#  3x10 95#  3x10 105#  3x10 105#  3x10 " 105#  3x10 105#  3x10 105#  3x10 115#  3x10   Ther Activity                                                        Gait Training                                                        Modalities                   LASER         Sprain/strain  15w     JF Sprain/strain  15w     JF Sprain/strain  15w     JF Sprain/strain  15w     JF   IE/HEP

## 2024-05-16 ENCOUNTER — APPOINTMENT (OUTPATIENT)
Dept: PHYSICAL THERAPY | Facility: CLINIC | Age: 34
End: 2024-05-16

## 2024-05-28 ENCOUNTER — TELEPHONE (OUTPATIENT)
Age: 34
End: 2024-05-28

## 2024-05-28 NOTE — TELEPHONE ENCOUNTER
Appointment scheduled with provider.    Reason: Office Visit    Symptoms: Re-establish Care    Provider: Dr. Angel Nava    Date/Time: Tuesday 7/9/2024 at 6:30 pm

## 2024-07-09 ENCOUNTER — OFFICE VISIT (OUTPATIENT)
Dept: FAMILY MEDICINE CLINIC | Facility: CLINIC | Age: 34
End: 2024-07-09

## 2024-07-09 VITALS
BODY MASS INDEX: 25.25 KG/M2 | WEIGHT: 176.4 LBS | TEMPERATURE: 97.9 F | HEART RATE: 66 BPM | OXYGEN SATURATION: 98 % | HEIGHT: 70 IN | SYSTOLIC BLOOD PRESSURE: 110 MMHG | DIASTOLIC BLOOD PRESSURE: 72 MMHG

## 2024-07-09 DIAGNOSIS — Z13.0 SCREENING FOR IRON DEFICIENCY ANEMIA: ICD-10-CM

## 2024-07-09 DIAGNOSIS — Z13.29 SCREENING FOR THYROID DISORDER: ICD-10-CM

## 2024-07-09 DIAGNOSIS — Z13.220 SCREENING FOR CHOLESTEROL LEVEL: ICD-10-CM

## 2024-07-09 DIAGNOSIS — Z00.00 ANNUAL PHYSICAL EXAM: Primary | ICD-10-CM

## 2024-07-09 DIAGNOSIS — Z13.1 SCREENING FOR DIABETES MELLITUS: ICD-10-CM

## 2024-07-09 PROCEDURE — 99395 PREV VISIT EST AGE 18-39: CPT | Performed by: FAMILY MEDICINE

## 2024-07-09 NOTE — PROGRESS NOTES
Adult Annual Physical  Name: Neha Romano      : 1990      MRN: [id number]  Encounter Provider: Angel Nava DO  Encounter Date: 2024   Encounter department: [facility]    Assessment & Plan   1. Annual physical exam  -     CBC; Future  -     Comprehensive metabolic panel; Future  -     Lipid Panel with Direct LDL reflex; Future  -     Hemoglobin A1C With EAG; Future  -     CBC  -     Comprehensive metabolic panel  -     Lipid Panel with Direct LDL reflex  -     Hemoglobin A1C With EAG  2. Screening for iron deficiency anemia  -     CBC; Future  -     CBC  3. Screening for diabetes mellitus  -     Comprehensive metabolic panel; Future  -     Comprehensive metabolic panel  4. Screening for cholesterol level  -     Lipid Panel with Direct LDL reflex; Future  -     Lipid Panel with Direct LDL reflex  5. Screening for thyroid disorder  -     Hemoglobin A1C With EAG; Future  -     Hemoglobin A1C With EAG  Immunizations and preventive care screenings were discussed with patient today. Appropriate education was printed on patient's after visit summary.    Counseling:  Alcohol/drug use: discussed moderation in alcohol intake, the recommendations for healthy alcohol use, and avoidance of illicit drug use.  Dental Health: discussed importance of regular tooth brushing, flossing, and dental visits.  Injury prevention: discussed safety/seat belts, safety helmets, smoke detectors, carbon dioxide detectors, and smoking near bedding or upholstery.  Sexual health: discussed sexually transmitted diseases, partner selection, use of condoms, avoidance of unintended pregnancy, and contraceptive alternatives.  Exercise: the importance of regular exercise/physical activity was discussed. Recommend exercise 3-5 times per week for at least 30 minutes.          History of Present Illness     Adult Annual Physical:  Patient presents for annual physical.     Diet and Physical Activity:  -  "Diet/Nutrition: well balanced diet.  - Exercise: 1-2 times a week on average and strength training exercises.    Depression Screening:  - PHQ-2 Score: 0    General Health:  - Sleep: sleeps well.  - Hearing: normal hearing bilateral ears.  - Vision: no vision problems.  - Dental: regular dental visits.     Health:  - History of STDs: no.   - Urinary symptoms: none.     Review of Systems   Constitutional:  Negative for activity change, chills, fatigue and fever.   HENT:  Negative for congestion, ear pain, sinus pressure and sore throat.    Eyes:  Negative for redness, itching and visual disturbance.   Respiratory:  Negative for cough and shortness of breath.    Cardiovascular:  Negative for chest pain and palpitations.   Gastrointestinal:  Negative for abdominal pain, diarrhea and nausea.   Endocrine: Negative for cold intolerance and heat intolerance.   Genitourinary:  Negative for dysuria, flank pain and frequency.   Musculoskeletal:  Negative for arthralgias, back pain, gait problem and myalgias.   Skin:  Negative for color change.   Allergic/Immunologic: Negative for environmental allergies.   Neurological:  Negative for dizziness, numbness and headaches.   Psychiatric/Behavioral:  Negative for behavioral problems and sleep disturbance.          Objective     /72 (BP Location: Left arm, Patient Position: Sitting, Cuff Size: Large)   Pulse 66   Temp 97.9 °F (36.6 °C) (Temporal)   Ht 5' 10\" (1.778 m)   Wt 80 kg (176 lb 6.4 oz)   SpO2 98%   BMI 25.31 kg/m²     Physical Exam  Vitals reviewed.   Constitutional:       General: He is not in acute distress.     Appearance: Normal appearance. He is well-developed.   HENT:      Head: Normocephalic and atraumatic.      Right Ear: Tympanic membrane, ear canal and external ear normal. There is no impacted cerumen.      Left Ear: Tympanic membrane, ear canal and external ear normal. There is no impacted cerumen.      Nose: Nose normal. No congestion or rhinorrhea. "      Mouth/Throat:      Mouth: Mucous membranes are moist.      Pharynx: No oropharyngeal exudate or posterior oropharyngeal erythema.   Eyes:      General: No scleral icterus.        Right eye: No discharge.         Left eye: No discharge.      Extraocular Movements: Extraocular movements intact.      Conjunctiva/sclera: Conjunctivae normal.      Pupils: Pupils are equal, round, and reactive to light.   Neck:      Trachea: No tracheal deviation.   Cardiovascular:      Rate and Rhythm: Normal rate and regular rhythm.      Pulses: Normal pulses.           Dorsalis pedis pulses are 2+ on the right side and 2+ on the left side.        Posterior tibial pulses are 2+ on the right side and 2+ on the left side.      Heart sounds: Normal heart sounds. No murmur heard.     No friction rub. No gallop.   Pulmonary:      Effort: Pulmonary effort is normal. No respiratory distress.      Breath sounds: Normal breath sounds. No wheezing, rhonchi or rales.   Abdominal:      General: Bowel sounds are normal. There is no distension.      Palpations: Abdomen is soft.      Tenderness: There is no abdominal tenderness. There is no guarding or rebound.   Musculoskeletal:         General: Normal range of motion.      Cervical back: Normal range of motion and neck supple.      Right lower leg: No edema.      Left lower leg: No edema.   Lymphadenopathy:      Head:      Right side of head: No submental or submandibular adenopathy.      Left side of head: No submental or submandibular adenopathy.      Cervical: No cervical adenopathy.      Right cervical: No superficial, deep or posterior cervical adenopathy.     Left cervical: No superficial, deep or posterior cervical adenopathy.   Skin:     General: Skin is warm and dry.      Findings: No erythema.   Neurological:      General: No focal deficit present.      Mental Status: He is alert and oriented to person, place, and time.      Cranial Nerves: No cranial nerve deficit.      Sensory:  Sensation is intact. No sensory deficit.      Motor: Motor function is intact.   Psychiatric:         Attention and Perception: Attention and perception normal.         Mood and Affect: Mood is not anxious or depressed.         Speech: Speech normal.         Behavior: Behavior normal.         Thought Content: Thought content normal.         Judgment: Judgment normal.

## 2024-07-09 NOTE — PATIENT INSTRUCTIONS
"Patient Education     Routine physical for adults   The Basics   Written by the doctors and editors at [facility]   What is a physical? -- A physical is a routine visit, or \"check-up,\" with your doctor. You might also hear it called a \"wellness visit\" or \"preventive visit.\"  During each visit, the doctor will:   Ask about your physical and mental health   Ask about your habits, behaviors, and lifestyle   Do an exam   Give you vaccines if needed   Talk to you about any medicines you take   Give advice about your health   Answer your questions  Getting regular check-ups is an important part of taking care of your health. It can help your doctor find and treat any problems you have. But it's also important for preventing health problems.  A routine physical is different from a \"sick visit.\" A sick visit is when you see a doctor because of a health concern or problem. Since physicals are scheduled ahead of time, you can think about what you want to ask the doctor.  How often should I get a physical? -- It depends on your age and health. In general, for people age 21 years and older:   If you are younger than 50 years, you might be able to get a physical every 3 years.   If you are 50 years or older, your doctor might recommend a physical every year.  If you have an ongoing health condition, like diabetes or high blood pressure, your doctor will probably want to see you more often.  What happens during a physical? -- In general, each visit will include:   Physical exam - The doctor or nurse will check your height, weight, heart rate, and blood pressure. They will also look at your eyes and ears. They will ask about how you are feeling and whether you have any symptoms that bother you.   Medicines - It's a good idea to bring a list of all the medicines you take to each doctor visit. Your doctor will talk to you about your medicines and answer any questions. Tell them if you are having any side effects that bother you. You " "should also tell them if you are having trouble paying for any of your medicines.   Habits and behaviors - This includes:   Your diet   Your exercise habits   Whether you smoke, drink alcohol, or use drugs   Whether you are sexually active   Whether you feel safe at home  Your doctor will talk to you about things you can do to improve your health and lower your risk of health problems. They will also offer help and support. For example, if you want to quit smoking, they can give you advice and might prescribe medicines. If you want to improve your diet or get more physical activity, they can help you with this, too.   Lab tests, if needed - The tests you get will depend on your age and situation. For example, your doctor might want to check your:   Cholesterol   Blood sugar   Iron level   Vaccines - The recommended vaccines will depend on your age, health, and what vaccines you already had. Vaccines are very important because they can prevent certain serious or deadly infections.   Discussion of screening - \"Screening\" means checking for diseases or other health problems before they cause symptoms. Your doctor can recommend screening based on your age, risk, and preferences. This might include tests to check for:   Cancer, such as breast, prostate, cervical, ovarian, colorectal, prostate, lung, or skin cancer   Sexually transmitted infections, such as chlamydia and gonorrhea   Mental health conditions like depression and anxiety  Your doctor will talk to you about the different types of screening tests. They can help you decide which screenings to have. They can also explain what the results might mean.   Answering questions - The physical is a good time to ask the doctor or nurse questions about your health. If needed, they can refer you to other doctors or specialists, too.  Adults older than 65 years often need other care, too. As you get older, your doctor will talk to you about:   How to prevent falling at " home   Hearing or vision tests   Memory testing   How to take your medicines safely   Making sure that you have the help and support you need at home  All topics are updated as new evidence becomes available and our peer review process is complete.  This topic retrieved from [employer] on: May 02, 2024.  Topic 130151 Version 1.0  Release: 32.4.3 - C32.122  © 2024 [employer] and/or its affiliates. All rights reserved.  Consumer Information Use and Disclaimer   Disclaimer: This generalized information is a limited summary of diagnosis, treatment, and/or medication information. It is not meant to be comprehensive and should be used as a tool to help the user understand and/or assess potential diagnostic and treatment options. It does NOT include all information about conditions, treatments, medications, side effects, or risks that may apply to a specific patient. It is not intended to be medical advice or a substitute for the medical advice, diagnosis, or treatment of a health care provider based on the health care provider's examination and assessment of a patient's specific and unique circumstances. Patients must speak with a health care provider for complete information about their health, medical questions, and treatment options, including any risks or benefits regarding use of medications. This information does not endorse any treatments or medications as safe, effective, or approved for treating a specific patient. [employer] and its affiliates disclaim any warranty or liability relating to this information or the use thereof.The use of this information is governed by the Terms of Use, available at https://www.[employer]ters[employer]uwer.com/en/know/clinical-effectiveness-terms. 2024© [employer] and its affiliates and/or licensors. All rights reserved.  Copyright   © 2024 [employer] and/or its affiliates. All rights reserved.

## 2024-07-20 ENCOUNTER — APPOINTMENT (OUTPATIENT)
Dept: LAB | Facility: CLINIC | Age: 34
End: 2024-07-20

## 2024-07-20 DIAGNOSIS — Z00.00 ANNUAL PHYSICAL EXAM: Primary | ICD-10-CM

## 2024-07-20 DIAGNOSIS — Z13.0 SCREENING FOR IRON DEFICIENCY ANEMIA: ICD-10-CM

## 2024-07-20 DIAGNOSIS — Z13.220 SCREENING FOR CHOLESTEROL LEVEL: ICD-10-CM

## 2024-07-20 DIAGNOSIS — Z13.29 SCREENING FOR THYROID DISORDER: ICD-10-CM

## 2024-07-20 DIAGNOSIS — Z13.1 SCREENING FOR DIABETES MELLITUS: ICD-10-CM

## 2024-07-20 LAB
CHOLEST SERPL-MCNC: 173 MG/DL
EST. AVERAGE GLUCOSE BLD GHB EST-MCNC: 120 MG/DL
HBA1C MFR BLD: 5.8 %
HDLC SERPL-MCNC: 57 MG/DL
LDLC SERPL CALC-MCNC: 101 MG/DL (ref 0–100)
TRIGL SERPL-MCNC: 74 MG/DL

## 2024-07-20 PROCEDURE — 36415 COLL VENOUS BLD VENIPUNCTURE: CPT

## 2024-07-20 PROCEDURE — 80061 LIPID PANEL: CPT

## 2024-07-20 PROCEDURE — 83036 HEMOGLOBIN GLYCOSYLATED A1C: CPT
